# Patient Record
Sex: FEMALE | Race: BLACK OR AFRICAN AMERICAN | NOT HISPANIC OR LATINO | Employment: FULL TIME | URBAN - METROPOLITAN AREA
[De-identification: names, ages, dates, MRNs, and addresses within clinical notes are randomized per-mention and may not be internally consistent; named-entity substitution may affect disease eponyms.]

---

## 2018-06-10 ENCOUNTER — HOSPITAL ENCOUNTER (EMERGENCY)
Facility: HOSPITAL | Age: 33
Discharge: HOME | End: 2018-06-10
Attending: FAMILY MEDICINE
Payer: MEDICAID

## 2018-06-10 VITALS
RESPIRATION RATE: 20 BRPM | WEIGHT: 135 LBS | HEART RATE: 87 BPM | SYSTOLIC BLOOD PRESSURE: 132 MMHG | BODY MASS INDEX: 25.49 KG/M2 | HEIGHT: 61 IN | OXYGEN SATURATION: 100 % | TEMPERATURE: 99 F | DIASTOLIC BLOOD PRESSURE: 84 MMHG

## 2018-06-10 DIAGNOSIS — M79.671 RIGHT FOOT PAIN: ICD-10-CM

## 2018-06-10 DIAGNOSIS — S93.401A RIGHT ANKLE SPRAIN: ICD-10-CM

## 2018-06-10 DIAGNOSIS — M79.672 FOOT PAIN, LEFT: ICD-10-CM

## 2018-06-10 DIAGNOSIS — M25.569 KNEE PAIN: ICD-10-CM

## 2018-06-10 DIAGNOSIS — S92.312A CLOSED DISPLACED FRACTURE OF FIRST METATARSAL BONE OF LEFT FOOT, INITIAL ENCOUNTER: Primary | ICD-10-CM

## 2018-06-10 PROCEDURE — 99283 EMERGENCY DEPT VISIT LOW MDM: CPT | Mod: 25

## 2018-06-10 PROCEDURE — 63600175 PHARM REV CODE 636 W HCPCS: Performed by: FAMILY MEDICINE

## 2018-06-10 PROCEDURE — 29515 APPLICATION SHORT LEG SPLINT: CPT

## 2018-06-10 PROCEDURE — 25000003 PHARM REV CODE 250: Performed by: FAMILY MEDICINE

## 2018-06-10 PROCEDURE — 96372 THER/PROPH/DIAG INJ SC/IM: CPT | Mod: 59

## 2018-06-10 RX ORDER — IBUPROFEN 800 MG/1
800 TABLET ORAL 3 TIMES DAILY PRN
Qty: 30 TABLET | Refills: 0 | Status: SHIPPED | OUTPATIENT
Start: 2018-06-10 | End: 2019-07-07

## 2018-06-10 RX ORDER — ORPHENADRINE CITRATE 30 MG/ML
30 INJECTION INTRAMUSCULAR; INTRAVENOUS
Status: COMPLETED | OUTPATIENT
Start: 2018-06-10 | End: 2018-06-10

## 2018-06-10 RX ORDER — HYDROCODONE BITARTRATE AND ACETAMINOPHEN 5; 325 MG/1; MG/1
1 TABLET ORAL EVERY 8 HOURS PRN
Qty: 12 TABLET | Refills: 0 | OUTPATIENT
Start: 2018-06-10 | End: 2019-01-07

## 2018-06-10 RX ORDER — NAPROXEN SODIUM 220 MG/1
81 TABLET, FILM COATED ORAL DAILY
COMMUNITY

## 2018-06-10 RX ORDER — KETOROLAC TROMETHAMINE 10 MG/1
10 TABLET, FILM COATED ORAL
Status: COMPLETED | OUTPATIENT
Start: 2018-06-10 | End: 2018-06-10

## 2018-06-10 RX ADMIN — ORPHENADRINE CITRATE 30 MG: 30 INJECTION INTRAMUSCULAR; INTRAVENOUS at 02:06

## 2018-06-10 RX ADMIN — KETOROLAC TROMETHAMINE 10 MG: 10 TABLET, FILM COATED ORAL at 01:06
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
## 2018-06-10 NOTE — ED NOTES
Short leg splint applied, pt tolerated well; pt educated on cast care; pt verbalized an understanding; Dr. Grant at bedside for assessment of splint and ok to discharge.
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
## 2018-11-10 ENCOUNTER — HOSPITAL ENCOUNTER (EMERGENCY)
Facility: HOSPITAL | Age: 33
Discharge: HOME | End: 2018-11-10
Attending: EMERGENCY MEDICINE
Payer: MEDICAID

## 2018-11-10 VITALS
RESPIRATION RATE: 19 BRPM | TEMPERATURE: 99 F | WEIGHT: 145 LBS | OXYGEN SATURATION: 100 % | BODY MASS INDEX: 27.38 KG/M2 | HEIGHT: 61 IN | DIASTOLIC BLOOD PRESSURE: 79 MMHG | HEART RATE: 92 BPM | SYSTOLIC BLOOD PRESSURE: 118 MMHG

## 2018-11-10 DIAGNOSIS — R05.9 COUGH: ICD-10-CM

## 2018-11-10 DIAGNOSIS — J06.9 URI, ACUTE: Primary | ICD-10-CM

## 2018-11-10 LAB
DEPRECATED S PYO AG THROAT QL EIA: NEGATIVE
FLUAV AG SPEC QL IA: NEGATIVE
FLUBV AG SPEC QL IA: NEGATIVE
SPECIMEN SOURCE: NORMAL

## 2018-11-10 PROCEDURE — 87400 INFLUENZA A/B EACH AG IA: CPT

## 2018-11-10 PROCEDURE — 87081 CULTURE SCREEN ONLY: CPT

## 2018-11-10 PROCEDURE — 63600175 PHARM REV CODE 636 W HCPCS: Performed by: PHYSICIAN ASSISTANT

## 2018-11-10 PROCEDURE — 87880 STREP A ASSAY W/OPTIC: CPT

## 2018-11-10 PROCEDURE — 99283 EMERGENCY DEPT VISIT LOW MDM: CPT | Mod: 25

## 2018-11-10 PROCEDURE — 96372 THER/PROPH/DIAG INJ SC/IM: CPT

## 2018-11-10 RX ORDER — DEXAMETHASONE SODIUM PHOSPHATE 4 MG/ML
8 INJECTION, SOLUTION INTRA-ARTICULAR; INTRALESIONAL; INTRAMUSCULAR; INTRAVENOUS; SOFT TISSUE
Status: COMPLETED | OUTPATIENT
Start: 2018-11-10 | End: 2018-11-10

## 2018-11-10 RX ORDER — BENZONATATE 100 MG/1
100 CAPSULE ORAL 3 TIMES DAILY PRN
Qty: 10 CAPSULE | Refills: 0 | Status: SHIPPED | OUTPATIENT
Start: 2018-11-10 | End: 2018-11-20

## 2018-11-10 RX ADMIN — DEXAMETHASONE SODIUM PHOSPHATE 8 MG: 4 INJECTION, SOLUTION INTRAMUSCULAR; INTRAVENOUS at 10:11

## 2018-11-11 NOTE — ED PROVIDER NOTES
Encounter Date: 11/10/2018       History     Chief Complaint   Patient presents with    Nasal Congestion     pt reports congestion with otalgia and post nasal drip X 4 days PTA     33-year-old female presents emergency department for evaluation of 4 day history of nasal congestion, cough, postnasal drip and right-sided ear pain. She reports that the symptoms began gradually 4 days ago and have been constant since onset.  She reports that the cough is productive with a mildly green sputum.  She reports that she has pain and pressure at her right ear which is radiating to her throat. She denies any headache, dizziness, vision changes, chest pain, shortness of breath, abdominal pain, nausea, vomiting or dysuria.  She attempted treatment at home with DayQuil and Ryann-Gypsum with minimal relief of symptoms.  Last dose was yesterday.  No treatment was attempted today prior to arrival.  She reports her last menstrual period was on 10/15/2018.  She denies risk of pregnancy.          Review of patient's allergies indicates:  No Known Allergies  Past Medical History:   Diagnosis Date    CHF (congestive heart failure)     Hypertension      History reviewed. No pertinent surgical history.  History reviewed. No pertinent family history.  Social History     Tobacco Use    Smoking status: Never Smoker    Smokeless tobacco: Never Used   Substance Use Topics    Alcohol use: Yes     Comment: occasionally    Drug use: No     Review of Systems   Constitutional: Negative for activity change, appetite change and fever.   HENT: Positive for congestion, postnasal drip, rhinorrhea, sinus pressure and sore throat. Negative for drooling, facial swelling, trouble swallowing and voice change.    Eyes: Negative for photophobia and visual disturbance.   Respiratory: Positive for cough. Negative for chest tightness, shortness of breath and wheezing.    Cardiovascular: Negative for chest pain.   Gastrointestinal: Negative for abdominal  pain, constipation, diarrhea, nausea and vomiting.   Genitourinary: Negative for decreased urine volume, dysuria and hematuria.   Musculoskeletal: Negative for back pain, joint swelling and neck pain.   Neurological: Negative for dizziness, syncope, weakness, light-headedness, numbness and headaches.       Physical Exam     Initial Vitals [11/10/18 2122]   BP Pulse Resp Temp SpO2   118/79 92 19 98.6 °F (37 °C) 100 %      MAP       --         Physical Exam    Nursing note and vitals reviewed.  Constitutional: She appears well-developed and well-nourished. She is not diaphoretic. No distress.   HENT:   Head: Normocephalic and atraumatic.   Right Ear: External ear normal.   Left Ear: External ear normal.   Nose: Nose normal.   Mouth/Throat: Oropharynx is clear and moist.   TMs reveal no erythema.  No perforation noted. No tenderness to palpation noted over the pinna, tragus or mastoids.  No tenderness to percussion noted over the frontal or maxillary sinuses.  Nasal terminates mildly boggy, no purulent discharge noted.  No postnasal drip noted.  Posterior pharynx reveals erythema, edema or tonsillar exudate.  No uvular edema or deviation noted. No trismus, stridor or drooling noted.   Eyes: Conjunctivae and EOM are normal. Pupils are equal, round, and reactive to light.   Neck: Normal range of motion. Neck supple.   Cardiovascular: Normal rate, regular rhythm and normal heart sounds.   Pulmonary/Chest: Breath sounds normal. No respiratory distress. She has no wheezes. She has no rhonchi. She has no rales. She exhibits no tenderness.   Abdominal: Soft. Bowel sounds are normal. She exhibits no distension. There is no tenderness. There is no rebound.   Musculoskeletal: Normal range of motion.   Lymphadenopathy:     She has no cervical adenopathy.   Neurological: She is alert and oriented to person, place, and time.   Skin: Skin is warm and dry.   Psychiatric: She has a normal mood and affect.         ED Course    Procedures  Labs Reviewed   THROAT SCREEN, RAPID   CULTURE, STREP A,  THROAT   INFLUENZA A AND B ANTIGEN          Imaging Results          X-Ray Chest PA And Lateral (Final result)  Result time 11/10/18 22:01:16    Final result by ABBY Marino Sr., MD (11/10/18 22:01:16)                 Impression:      1. The lungs are clear.  2. There is a minimal amount of dextroconvex curvature of the thoracic spine.  .      Electronically signed by: Aydin Marino MD  Date:    11/10/2018  Time:    22:01             Narrative:    EXAMINATION:  XR CHEST PA AND LATERAL    CLINICAL HISTORY:  Cough    COMPARISON:  05/31/2010    FINDINGS:  The size and contour of the heart are normal. The lungs are clear. There is no pneumothorax or pleural effusion.  There is a minimal amount of dextroconvex curvature of the thoracic spine.                                 Medical Decision Making:   Initial Assessment:   33-year-old female presents for evaluation of nasal congestion, otalgia, cough and rhinorrhea. Physical exam reveals a nontoxic-appearing female in no acute distress. Patient is afebrile vital signs are within normal limits. Neurological exam reveals an alert and oriented patient. TMs reveal no erythema.  No perforation noted. No tenderness to palpation noted over the pinna, tragus or mastoids.  No tenderness to percussion noted over the frontal or maxillary sinuses.  Nasal terminates mildly boggy, no purulent discharge noted.  No postnasal drip noted.  Posterior pharynx reveals erythema, edema or tonsillar exudate.  No uvular edema or deviation noted. No trismus, stridor or drooling noted. Neck is supple with no meningeal signs noted. Lungs clear to auscultation bilaterally. No respiratory distress or accessory muscle use noted.  Differential Diagnosis:   Viral URI  Streptococcal pharyngitis  Influenza  Chest x-ray ordered to assess possible pneumonia or consolidation.    ED Management:  Chest x-ray reveals no evidence of  pneumonia or consolidation.  Minimal dextro convex curvature of the thoracic spine noted.  Influenza negative. Rapid strep negative. Discussed these findings at length with the patient verbalizes understanding and agreement course treatment.  Patient given Decadron for symptom control.  Instructed the patient to follow up with the primary care provider for re-evaluation and to return to the emergency department immediately for any new or worsening symptoms.                      Clinical Impression:   The primary encounter diagnosis was URI, acute. A diagnosis of Cough was also pertinent to this visit.                             Sangeeta Rogers PA-C  11/11/18 4293

## 2018-11-11 NOTE — DISCHARGE INSTRUCTIONS
Your chest x-ray did not reveal any evidence of pneumonia or consolidation.  Your strep test and influenza test were negative. You are instructed to follow up with your primary care provider for re-evaluation within 3 days.

## 2018-11-14 LAB — BACTERIA THROAT CULT: NORMAL

## 2019-01-07 ENCOUNTER — HOSPITAL ENCOUNTER (EMERGENCY)
Facility: HOSPITAL | Age: 34
Discharge: HOME | End: 2019-01-07
Attending: EMERGENCY MEDICINE
Payer: MEDICAID

## 2019-01-07 VITALS
WEIGHT: 150 LBS | RESPIRATION RATE: 20 BRPM | BODY MASS INDEX: 28.32 KG/M2 | SYSTOLIC BLOOD PRESSURE: 119 MMHG | OXYGEN SATURATION: 99 % | HEIGHT: 61 IN | TEMPERATURE: 98 F | DIASTOLIC BLOOD PRESSURE: 77 MMHG | HEART RATE: 78 BPM

## 2019-01-07 DIAGNOSIS — M54.9 BACK PAIN: ICD-10-CM

## 2019-01-07 DIAGNOSIS — S23.9XXA THORACIC BACK SPRAIN, INITIAL ENCOUNTER: Primary | ICD-10-CM

## 2019-01-07 LAB
B-HCG UR QL: NEGATIVE
BACTERIA #/AREA URNS AUTO: NORMAL /HPF
BILIRUB UR QL STRIP: NEGATIVE
CAOX CRY UR QL COMP ASSIST: NORMAL
CLARITY UR REFRACT.AUTO: CLEAR
COLOR UR AUTO: ABNORMAL
GLUCOSE UR QL STRIP: NEGATIVE
HGB UR QL STRIP: ABNORMAL
KETONES UR QL STRIP: ABNORMAL
LEUKOCYTE ESTERASE UR QL STRIP: ABNORMAL
MICROSCOPIC COMMENT: NORMAL
NITRITE UR QL STRIP: NEGATIVE
PH UR STRIP: 6 [PH] (ref 5–8)
PROT UR QL STRIP: ABNORMAL
RBC #/AREA URNS AUTO: 2 /HPF (ref 0–4)
SP GR UR STRIP: 1.02 (ref 1–1.03)
SQUAMOUS #/AREA URNS AUTO: NORMAL /HPF
URN SPEC COLLECT METH UR: ABNORMAL
UROBILINOGEN UR STRIP-ACNC: 1 EU/DL
WBC #/AREA URNS AUTO: 2 /HPF (ref 0–5)

## 2019-01-07 PROCEDURE — 63600175 PHARM REV CODE 636 W HCPCS: Mod: ER | Performed by: PHYSICIAN ASSISTANT

## 2019-01-07 PROCEDURE — 25000003 PHARM REV CODE 250: Mod: ER | Performed by: PHYSICIAN ASSISTANT

## 2019-01-07 PROCEDURE — 81025 URINE PREGNANCY TEST: CPT | Mod: ER

## 2019-01-07 PROCEDURE — 93010 EKG 12-LEAD: ICD-10-PCS | Mod: ,,, | Performed by: INTERNAL MEDICINE

## 2019-01-07 PROCEDURE — 96372 THER/PROPH/DIAG INJ SC/IM: CPT | Mod: 59,ER

## 2019-01-07 PROCEDURE — 99284 EMERGENCY DEPT VISIT MOD MDM: CPT | Mod: 25,ER

## 2019-01-07 PROCEDURE — 93010 ELECTROCARDIOGRAM REPORT: CPT | Mod: ,,, | Performed by: INTERNAL MEDICINE

## 2019-01-07 PROCEDURE — 93005 ELECTROCARDIOGRAM TRACING: CPT | Mod: ER

## 2019-01-07 PROCEDURE — 81000 URINALYSIS NONAUTO W/SCOPE: CPT | Mod: ER

## 2019-01-07 RX ORDER — METOPROLOL SUCCINATE 50 MG/1
50 TABLET, EXTENDED RELEASE ORAL DAILY
Qty: 30 TABLET | Refills: 1 | Status: SHIPPED | OUTPATIENT
Start: 2019-01-07 | End: 2020-10-30 | Stop reason: SDUPTHER

## 2019-01-07 RX ORDER — KETOROLAC TROMETHAMINE 30 MG/ML
60 INJECTION, SOLUTION INTRAMUSCULAR; INTRAVENOUS
Status: COMPLETED | OUTPATIENT
Start: 2019-01-07 | End: 2019-01-07

## 2019-01-07 RX ORDER — HYDROCODONE BITARTRATE AND ACETAMINOPHEN 10; 325 MG/1; MG/1
1 TABLET ORAL EVERY 4 HOURS PRN
Qty: 18 TABLET | Refills: 0 | Status: SHIPPED | OUTPATIENT
Start: 2019-01-07 | End: 2019-07-07

## 2019-01-07 RX ORDER — DIAZEPAM 5 MG/1
5 TABLET ORAL
Status: COMPLETED | OUTPATIENT
Start: 2019-01-07 | End: 2019-01-07

## 2019-01-07 RX ORDER — DIAZEPAM 5 MG/1
5 TABLET ORAL EVERY 6 HOURS PRN
Qty: 10 TABLET | Refills: 0 | Status: SHIPPED | OUTPATIENT
Start: 2019-01-07 | End: 2019-07-07

## 2019-01-07 RX ORDER — FUROSEMIDE 80 MG/1
80 TABLET ORAL DAILY
Qty: 30 TABLET | Refills: 0 | Status: SHIPPED | OUTPATIENT
Start: 2019-01-07 | End: 2020-07-07 | Stop reason: SDUPTHER

## 2019-01-07 RX ADMIN — DIAZEPAM 5 MG: 5 TABLET ORAL at 08:01

## 2019-01-07 RX ADMIN — KETOROLAC TROMETHAMINE 60 MG: 30 INJECTION, SOLUTION INTRAMUSCULAR at 08:01

## 2019-01-08 NOTE — ED PROVIDER NOTES
"Encounter Date: 1/7/2019       History     Chief Complaint   Patient presents with    Back Pain     Pt states "I woke up this morning with my back pain and it wraps around into my chest. I had this before and it was a stroke. I have a headache as well." Pt c/o upper back pain 8/10, states she could not lift anything at work. Pt states she has been diaphoretic and SOB, denies nausea.     Chest Pain     Patient is a 33-year-old female with a past medical history of spondylosis, thoracic disc injury with disease and scoliosis of her upper back.  Patient woke up this morning with upper back pain radiating along her ribcage from back to front.  This was aggravated by movement alleviated somewhat by rest.  The pain is worse whenever she tries to lift up her arms or rotate her shoulder.  She has not tried any medication.          Review of patient's allergies indicates:  No Known Allergies  Past Medical History:   Diagnosis Date    CHF (congestive heart failure)     Hypertension      History reviewed. No pertinent surgical history.  History reviewed. No pertinent family history.  Social History     Tobacco Use    Smoking status: Never Smoker    Smokeless tobacco: Never Used   Substance Use Topics    Alcohol use: Yes     Comment: occasionally    Drug use: No     Review of Systems   Constitutional: Negative for diaphoresis, fatigue and fever.        14 Point ROS completed and negative with the exception of HPI and Below.   HENT: Negative for congestion, ear pain and sore throat.    Eyes: Negative for discharge and itching.   Respiratory: Negative for cough, chest tightness, shortness of breath and wheezing.    Cardiovascular: Negative for chest pain, palpitations and leg swelling.   Gastrointestinal: Negative for abdominal distention, abdominal pain, nausea and vomiting.   Genitourinary: Negative for dysuria, flank pain and frequency.   Musculoskeletal: Positive for back pain. Negative for neck pain and neck " stiffness.        Rib pain   Skin: Negative for pallor, rash and wound.   Neurological: Negative for dizziness, syncope, facial asymmetry, weakness and headaches.   Hematological: Does not bruise/bleed easily.   Psychiatric/Behavioral: Negative for agitation, behavioral problems and confusion.   All other systems reviewed and are negative.      Physical Exam     Initial Vitals [01/07/19 1948]   BP Pulse Resp Temp SpO2   (!) 123/91 87 20 98.6 °F (37 °C) --      MAP       --         Physical Exam    Constitutional: She appears well-developed and well-nourished.   HENT:   Head: Normocephalic and atraumatic.   Right Ear: External ear normal.   Left Ear: External ear normal.   Nose: Nose normal.   Mouth/Throat: Oropharynx is clear and moist.   Eyes: Conjunctivae and EOM are normal. Pupils are equal, round, and reactive to light. Right eye exhibits no discharge. Left eye exhibits no discharge. No scleral icterus.   Neck: Normal range of motion. Neck supple. No thyromegaly present. No tracheal deviation present. No JVD present.   Cardiovascular: Normal rate, regular rhythm, normal heart sounds and intact distal pulses. Exam reveals no gallop and no friction rub.    No murmur heard.  Pulmonary/Chest: Breath sounds normal. No stridor. No respiratory distress. She has no wheezes. She has no rhonchi. She has no rales. She exhibits no tenderness.   Abdominal: Soft. Bowel sounds are normal. She exhibits no distension and no mass. There is no tenderness. There is no rebound and no guarding.   Musculoskeletal: Normal range of motion. She exhibits tenderness. She exhibits no edema.   On exam the patient has tenderness noted to the middle to upper thoracic spine in the midline as well as to the paraspinous musculature with muscle spasm on the left side.  She has pain along her ribs and a nerve pathway from the back to the front.  There is also tenderness to the rib in the front as well.  This pain is similar to previous in the past  when she was diagnosed with thoracic disc disease.     Lymphadenopathy:     She has no cervical adenopathy.   Neurological: She is alert and oriented to person, place, and time. She has normal strength and normal reflexes. She displays normal reflexes. No cranial nerve deficit or sensory deficit.   Skin: Skin is warm and dry. Capillary refill takes less than 2 seconds. No rash and no abscess noted. No erythema. No pallor.   Psychiatric: She has a normal mood and affect. Her behavior is normal. Thought content normal.         ED Course   Procedures  Labs Reviewed   PREGNANCY TEST, URINE RAPID   URINALYSIS, REFLEX TO URINE CULTURE          Imaging Results    None          Medical Decision Making:   Initial Assessment:   Patient is a 33-year-old female with a past medical history of spondylosis, thoracic disc injury with disease and scoliosis of her upper back.  Patient woke up this morning with upper back pain radiating along her ribcage from back to front.  This was aggravated by movement alleviated somewhat by rest.  The pain is worse whenever she tries to lift up her arms or rotate her shoulder.  She has not tried any medication.  On exam the patient has tenderness noted to the middle to upper thoracic spine in the midline as well as to the paraspinous musculature with muscle spasm on the left side.  She has pain along her ribs and a nerve pathway from the back to the front.  There is also tenderness to the rib in the front as well.  This pain is similar to previous in the past when she was diagnosed with thoracic disc disease.  Differential Diagnosis:   Herniated intervertebral disc, muscle sprain, nerve radiculopathy.  ED Management:  Muscle relaxers, anti-inflammatories, pain medication and patient will need to follow up with her spinal physician.  Patient reports being out of her metoprolol and her Lasix, requesting a refill on that which we will provide.                      Clinical Impression:   The primary  encounter diagnosis was Thoracic back sprain, initial encounter. A diagnosis of Back pain was also pertinent to this visit.                             Tu Hahn PA-C  01/07/19 2048       Tu Hahn PA-C  01/07/19 2050

## 2019-01-08 NOTE — ED NOTES
"Been having neck and back pain. Stated it also wrapped around her chest. " I use to see a specialist before but I stop going."  "

## 2019-01-08 NOTE — ED TRIAGE NOTES
"Pt states "I woke up this morning with my back pain and it wraps around into my chest. I had this before and it was a stroke. I have a headache as well." Pt c/o upper back pain 8/10, states she could not lift anything at work. Pt states she has been diaphoretic and SOB, denies nausea.   "

## 2019-06-09 ENCOUNTER — HOSPITAL ENCOUNTER (EMERGENCY)
Facility: HOSPITAL | Age: 34
Discharge: HOME | End: 2019-06-09
Attending: FAMILY MEDICINE
Payer: COMMERCIAL

## 2019-06-09 VITALS
BODY MASS INDEX: 31.15 KG/M2 | HEIGHT: 61 IN | HEART RATE: 81 BPM | SYSTOLIC BLOOD PRESSURE: 117 MMHG | DIASTOLIC BLOOD PRESSURE: 76 MMHG | WEIGHT: 165 LBS | OXYGEN SATURATION: 97 % | RESPIRATION RATE: 20 BRPM | TEMPERATURE: 99 F

## 2019-06-09 DIAGNOSIS — M25.512 ACUTE PAIN OF LEFT SHOULDER: Primary | ICD-10-CM

## 2019-06-09 DIAGNOSIS — R05.9 COUGH: ICD-10-CM

## 2019-06-09 LAB
ALBUMIN SERPL BCP-MCNC: 4.8 G/DL (ref 3.5–5.2)
ALP SERPL-CCNC: 69 U/L (ref 38–126)
ALT SERPL W/O P-5'-P-CCNC: 21 U/L (ref 10–44)
ANION GAP SERPL CALC-SCNC: 11 MMOL/L (ref 8–16)
AST SERPL-CCNC: 19 U/L (ref 15–46)
BASOPHILS # BLD AUTO: 0.02 K/UL (ref 0–0.2)
BASOPHILS NFR BLD: 0.3 % (ref 0–1.9)
BILIRUB SERPL-MCNC: 0.5 MG/DL (ref 0.1–1)
BUN SERPL-MCNC: 11 MG/DL (ref 7–17)
CALCIUM SERPL-MCNC: 10 MG/DL (ref 8.7–10.5)
CHLORIDE SERPL-SCNC: 102 MMOL/L (ref 95–110)
CO2 SERPL-SCNC: 29 MMOL/L (ref 23–29)
CREAT SERPL-MCNC: 0.89 MG/DL (ref 0.5–1.4)
DIFFERENTIAL METHOD: ABNORMAL
EOSINOPHIL # BLD AUTO: 0.1 K/UL (ref 0–0.5)
EOSINOPHIL NFR BLD: 1 % (ref 0–8)
ERYTHROCYTE [DISTWIDTH] IN BLOOD BY AUTOMATED COUNT: 13.5 % (ref 11.5–14.5)
EST. GFR  (AFRICAN AMERICAN): >60 ML/MIN/1.73 M^2
EST. GFR  (NON AFRICAN AMERICAN): >60 ML/MIN/1.73 M^2
GLUCOSE SERPL-MCNC: 99 MG/DL (ref 70–110)
HCT VFR BLD AUTO: 40.7 % (ref 37–48.5)
HGB BLD-MCNC: 13.5 G/DL (ref 12–16)
LYMPHOCYTES # BLD AUTO: 2.9 K/UL (ref 1–4.8)
LYMPHOCYTES NFR BLD: 51.1 % (ref 18–48)
MCH RBC QN AUTO: 29.2 PG (ref 27–31)
MCHC RBC AUTO-ENTMCNC: 33.2 G/DL (ref 32–36)
MCV RBC AUTO: 88 FL (ref 82–98)
MONOCYTES # BLD AUTO: 0.5 K/UL (ref 0.3–1)
MONOCYTES NFR BLD: 7.9 % (ref 4–15)
NEUTROPHILS # BLD AUTO: 2.3 K/UL (ref 1.8–7.7)
NEUTROPHILS NFR BLD: 39.5 % (ref 38–73)
NT-PROBNP: 73 PG/ML (ref 5–450)
PLATELET # BLD AUTO: 302 K/UL (ref 150–350)
PMV BLD AUTO: 11.9 FL (ref 9.2–12.9)
POTASSIUM SERPL-SCNC: 3.5 MMOL/L (ref 3.5–5.1)
PROT SERPL-MCNC: 8.7 G/DL (ref 6–8.4)
RBC # BLD AUTO: 4.62 M/UL (ref 4–5.4)
SODIUM SERPL-SCNC: 142 MMOL/L (ref 136–145)
WBC # BLD AUTO: 5.73 K/UL (ref 3.9–12.7)

## 2019-06-09 PROCEDURE — 83880 ASSAY OF NATRIURETIC PEPTIDE: CPT | Mod: ER

## 2019-06-09 PROCEDURE — 80053 COMPREHEN METABOLIC PANEL: CPT | Mod: ER

## 2019-06-09 PROCEDURE — 25000003 PHARM REV CODE 250: Mod: ER | Performed by: NURSE PRACTITIONER

## 2019-06-09 PROCEDURE — 85025 COMPLETE CBC W/AUTO DIFF WBC: CPT | Mod: ER

## 2019-06-09 PROCEDURE — 99284 EMERGENCY DEPT VISIT MOD MDM: CPT | Mod: 25,ER

## 2019-06-09 RX ORDER — CODEINE PHOSPHATE AND GUAIFENESIN 10; 100 MG/5ML; MG/5ML
5 SOLUTION ORAL 3 TIMES DAILY PRN
Qty: 120 ML | Refills: 0 | Status: SHIPPED | OUTPATIENT
Start: 2019-06-09 | End: 2019-06-19

## 2019-06-09 RX ORDER — KETOROLAC TROMETHAMINE 10 MG/1
10 TABLET, FILM COATED ORAL EVERY 6 HOURS
Qty: 20 TABLET | Refills: 0 | Status: SHIPPED | OUTPATIENT
Start: 2019-06-09 | End: 2019-07-07

## 2019-06-09 RX ORDER — IBUPROFEN 400 MG/1
800 TABLET ORAL
Status: COMPLETED | OUTPATIENT
Start: 2019-06-09 | End: 2019-06-09

## 2019-06-09 RX ADMIN — IBUPROFEN 800 MG: 400 TABLET, FILM COATED ORAL at 10:06

## 2019-06-10 NOTE — ED PROVIDER NOTES
Encounter Date: 6/9/2019       History     Chief Complaint   Patient presents with    Cough     dry cough x 2 weeks. afebrile at home. hx of chf.    Arm Pain     left arm pain x4 days. hurts to lay on arm. denies injury     Patient is a 34-year-old female that presents with a complaint of cough x2 weeks.  She reports history of congestive heart failure and is currently taking medications as prescribed.  States that she feels like she had the flu over a week ago but that the cough still continues.  Denies any fever, nausea and vomiting. Denies any chest pain or shortness of breath at this time.  Also complains of left shoulder and arm pain and weakness x3 days.  Denies any injury to the left arm.  States that when she tries to lift something with her arm she gets pain in her shoulder that radiates down the left arm.  Patient shows no signs of distress at this time.  No medications taken for relief of pain in the left arm.        Review of patient's allergies indicates:  No Known Allergies  Past Medical History:   Diagnosis Date    CHF (congestive heart failure)     Hypertension      History reviewed. No pertinent surgical history.  History reviewed. No pertinent family history.  Social History     Tobacco Use    Smoking status: Never Smoker    Smokeless tobacco: Never Used   Substance Use Topics    Alcohol use: Yes     Comment: occasionally    Drug use: No     Review of Systems   Constitutional: Negative for fever.   HENT: Negative for sore throat.    Respiratory: Positive for cough. Negative for shortness of breath.    Cardiovascular: Negative for chest pain.   Gastrointestinal: Negative for nausea.   Genitourinary: Negative for dysuria.   Musculoskeletal: Positive for myalgias (Left shoulder and arm). Negative for back pain.   Skin: Negative for rash.   Neurological: Negative for weakness.   Hematological: Does not bruise/bleed easily.   All other systems reviewed and are negative.      Physical Exam      Initial Vitals [06/09/19 2133]   BP Pulse Resp Temp SpO2   117/76 81 20 98.5 °F (36.9 °C) 97 %      MAP       --         Physical Exam    Nursing note and vitals reviewed.  Constitutional: She appears well-developed and well-nourished.   HENT:   Head: Normocephalic and atraumatic.   Eyes: EOM are normal. Pupils are equal, round, and reactive to light.   Neck: Normal range of motion. Neck supple.   Cardiovascular: Normal rate, regular rhythm, normal heart sounds and intact distal pulses.   Pulmonary/Chest: Breath sounds normal.   Abdominal: Soft. Bowel sounds are normal.   Musculoskeletal: Normal range of motion.        Left shoulder: She exhibits tenderness (Tenderness noted to the anterior left shoulder) and pain (Left shoulder). She exhibits normal range of motion.   Neurological: She is alert and oriented to person, place, and time. She has normal strength and normal reflexes.   Skin: Skin is warm and dry.         ED Course   Procedures  Labs Reviewed   CBC W/ AUTO DIFFERENTIAL - Abnormal; Notable for the following components:       Result Value    Lymph% 51.1 (*)     All other components within normal limits   COMPREHENSIVE METABOLIC PANEL   NT-PRO NATRIURETIC PEPTIDE          Imaging Results    None          Medical Decision Making:   Clinical Tests:   Lab Tests: Reviewed  Radiological Study: Reviewed  ED Management:  Patient informed of all imaging and lab results.  Patient instructed to take medications as prescribed and follow up with primary care physician.  Patient to return to the emergency room with any worsening symptoms. No distress noted at time of discharge.                      Clinical Impression:       ICD-10-CM ICD-9-CM   1. Acute pain of left shoulder M25.512 719.41   2. Cough R05 786.2         Disposition:   Disposition: Discharged                        Jacob Chen NP  06/09/19 4103

## 2019-06-10 NOTE — ED TRIAGE NOTES
dry cough x 2 weeks. afebrile at home. hx of chf. Left arm pain x 4 days, painful to lay on left side.

## 2019-07-07 ENCOUNTER — HOSPITAL ENCOUNTER (EMERGENCY)
Facility: HOSPITAL | Age: 34
Discharge: HOME | End: 2019-07-07
Attending: SURGERY
Payer: COMMERCIAL

## 2019-07-07 VITALS
HEART RATE: 69 BPM | DIASTOLIC BLOOD PRESSURE: 89 MMHG | RESPIRATION RATE: 15 BRPM | OXYGEN SATURATION: 99 % | WEIGHT: 155 LBS | TEMPERATURE: 98 F | BODY MASS INDEX: 29.29 KG/M2 | SYSTOLIC BLOOD PRESSURE: 126 MMHG

## 2019-07-07 DIAGNOSIS — T23.039A BURN OF FINGERS: Primary | ICD-10-CM

## 2019-07-07 PROCEDURE — 25000003 PHARM REV CODE 250: Mod: ER | Performed by: PHYSICIAN ASSISTANT

## 2019-07-07 PROCEDURE — 96372 THER/PROPH/DIAG INJ SC/IM: CPT | Mod: 59,ER

## 2019-07-07 PROCEDURE — 99284 EMERGENCY DEPT VISIT MOD MDM: CPT | Mod: 25,ER

## 2019-07-07 PROCEDURE — 90715 TDAP VACCINE 7 YRS/> IM: CPT | Mod: ER | Performed by: PHYSICIAN ASSISTANT

## 2019-07-07 PROCEDURE — 63600175 PHARM REV CODE 636 W HCPCS: Mod: ER | Performed by: PHYSICIAN ASSISTANT

## 2019-07-07 PROCEDURE — 16020 DRESS/DEBRID P-THICK BURN S: CPT | Mod: ER

## 2019-07-07 PROCEDURE — 90471 IMMUNIZATION ADMIN: CPT | Mod: ER | Performed by: PHYSICIAN ASSISTANT

## 2019-07-07 RX ORDER — SULFAMETHOXAZOLE AND TRIMETHOPRIM 800; 160 MG/1; MG/1
1 TABLET ORAL
Status: COMPLETED | OUTPATIENT
Start: 2019-07-07 | End: 2019-07-07

## 2019-07-07 RX ORDER — KETOROLAC TROMETHAMINE 30 MG/ML
30 INJECTION, SOLUTION INTRAMUSCULAR; INTRAVENOUS
Status: COMPLETED | OUTPATIENT
Start: 2019-07-07 | End: 2019-07-07

## 2019-07-07 RX ORDER — KETOROLAC TROMETHAMINE 10 MG/1
10 TABLET, FILM COATED ORAL EVERY 6 HOURS
Qty: 10 TABLET | Refills: 0 | Status: SHIPPED | OUTPATIENT
Start: 2019-07-07 | End: 2020-06-03

## 2019-07-07 RX ORDER — SILVER SULFADIAZINE 10 G/1000G
CREAM TOPICAL
Status: DISCONTINUED
Start: 2019-07-07 | End: 2019-07-07 | Stop reason: WASHOUT

## 2019-07-07 RX ORDER — SULFAMETHOXAZOLE AND TRIMETHOPRIM 800; 160 MG/1; MG/1
1 TABLET ORAL 2 TIMES DAILY
Qty: 14 TABLET | Refills: 0 | Status: SHIPPED | OUTPATIENT
Start: 2019-07-07 | End: 2019-07-14

## 2019-07-07 RX ORDER — MUPIROCIN 20 MG/G
OINTMENT TOPICAL 3 TIMES DAILY
Qty: 15 G | Refills: 0 | Status: SHIPPED | OUTPATIENT
Start: 2019-07-07 | End: 2020-10-30 | Stop reason: ALTCHOICE

## 2019-07-07 RX ADMIN — BACITRACIN, NEOMYCIN, POLYMYXIN B 1 EACH: 400; 3.5; 5 OINTMENT TOPICAL at 12:07

## 2019-07-07 RX ADMIN — KETOROLAC TROMETHAMINE 30 MG: 30 INJECTION, SOLUTION INTRAMUSCULAR at 12:07

## 2019-07-07 RX ADMIN — CLOSTRIDIUM TETANI TOXOID ANTIGEN (FORMALDEHYDE INACTIVATED), CORYNEBACTERIUM DIPHTHERIAE TOXOID ANTIGEN (FORMALDEHYDE INACTIVATED), BORDETELLA PERTUSSIS TOXOID ANTIGEN (GLUTARALDEHYDE INACTIVATED), BORDETELLA PERTUSSIS FILAMENTOUS HEMAGGLUTININ ANTIGEN (FORMALDEHYDE INACTIVATED), BORDETELLA PERTUSSIS PERTACTIN ANTIGEN, AND BORDETELLA PERTUSSIS FIMBRIAE 2/3 ANTIGEN 0.5 ML: 5; 2; 2.5; 5; 3; 5 INJECTION, SUSPENSION INTRAMUSCULAR at 01:07

## 2019-07-07 RX ADMIN — SULFAMETHOXAZOLE AND TRIMETHOPRIM 1 TABLET: 800; 160 TABLET ORAL at 12:07
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
## 2019-07-07 NOTE — DISCHARGE INSTRUCTIONS
Are advised to follow up with her primary care provider for re-evaluation within 3 days.  You are instructed to return to the emergency department immediately for any new or worsening symptoms.

## 2019-11-06 ENCOUNTER — HOSPITAL ENCOUNTER (EMERGENCY)
Facility: HOSPITAL | Age: 34
Discharge: HOME | End: 2019-11-06
Attending: EMERGENCY MEDICINE
Payer: COMMERCIAL

## 2019-11-06 VITALS
WEIGHT: 150 LBS | BODY MASS INDEX: 28.32 KG/M2 | RESPIRATION RATE: 12 BRPM | OXYGEN SATURATION: 99 % | DIASTOLIC BLOOD PRESSURE: 80 MMHG | HEART RATE: 72 BPM | HEIGHT: 61 IN | TEMPERATURE: 98 F | SYSTOLIC BLOOD PRESSURE: 140 MMHG

## 2019-11-06 DIAGNOSIS — J32.1 FRONTAL SINUSITIS, UNSPECIFIED CHRONICITY: ICD-10-CM

## 2019-11-06 DIAGNOSIS — B34.9 VIRAL SYNDROME: Primary | ICD-10-CM

## 2019-11-06 DIAGNOSIS — J06.9 UPPER RESPIRATORY TRACT INFECTION, UNSPECIFIED TYPE: ICD-10-CM

## 2019-11-06 LAB
INFLUENZA A, MOLECULAR: NEGATIVE
INFLUENZA B, MOLECULAR: NEGATIVE
SPECIMEN SOURCE: NORMAL

## 2019-11-06 PROCEDURE — 99284 EMERGENCY DEPT VISIT MOD MDM: CPT | Mod: ER

## 2019-11-06 PROCEDURE — 87502 INFLUENZA DNA AMP PROBE: CPT | Mod: ER

## 2019-11-06 RX ORDER — AMOXICILLIN AND CLAVULANATE POTASSIUM 875; 125 MG/1; MG/1
1 TABLET, FILM COATED ORAL EVERY 12 HOURS
Qty: 20 TABLET | Refills: 0 | Status: SHIPPED | OUTPATIENT
Start: 2019-11-06 | End: 2019-11-16

## 2019-11-06 RX ORDER — PREDNISONE 50 MG/1
50 TABLET ORAL DAILY
Qty: 5 TABLET | Refills: 0 | Status: SHIPPED | OUTPATIENT
Start: 2019-11-06 | End: 2019-11-11

## 2019-11-06 RX ORDER — FLUTICASONE PROPIONATE 50 MCG
1 SPRAY, SUSPENSION (ML) NASAL 2 TIMES DAILY PRN
Qty: 15 G | Refills: 0 | Status: SHIPPED | OUTPATIENT
Start: 2019-11-06 | End: 2020-06-03

## 2019-11-07 NOTE — DISCHARGE INSTRUCTIONS
Supportive treatment drink plenty of fluids, corcidin BP for congestion and medications as prescribed.  Follow up with PMD this week return if any

## 2019-11-07 NOTE — ED PROVIDER NOTES
Encounter Date: 11/6/2019       History     Chief Complaint   Patient presents with    Influenza     Generalized body aches, headaches, cough, decreased appetite and chills for 1 week. Patient did not take any medication within the last 4 hrs for symptoms.      33 yo female presenting to ER for complaint of body ache congestion for past 8 days. Pt states has been sick and trying to go to work and not improving now ear pain and pressure.  Sinus pain dizziness and bad breath.  Pt states still having body aches and low grade fever.        Review of patient's allergies indicates:  No Known Allergies  Past Medical History:   Diagnosis Date    CHF (congestive heart failure)     Hypertension     Stroke      History reviewed. No pertinent surgical history.  History reviewed. No pertinent family history.  Social History     Tobacco Use    Smoking status: Never Smoker    Smokeless tobacco: Never Used   Substance Use Topics    Alcohol use: Yes     Comment: occasionally    Drug use: No     Review of Systems   Constitutional: Positive for fatigue.   HENT: Positive for congestion, ear pain, postnasal drip, rhinorrhea, sinus pressure and sinus pain.    Respiratory: Positive for cough. Negative for chest tightness.    Cardiovascular: Negative.    Musculoskeletal: Positive for myalgias.   Skin: Negative for rash.   All other systems reviewed and are negative.      Physical Exam     Initial Vitals [11/06/19 1926]   BP Pulse Resp Temp SpO2   130/85 79 18 98.8 °F (37.1 °C) 99 %      MAP       --         Physical Exam    Nursing note and vitals reviewed.  Constitutional: She appears well-developed and well-nourished.   HENT:   Head: Normocephalic and atraumatic.   Nose: Nose normal.   Mouth/Throat: Oropharynx is clear and moist.   Frontal sinus tenderness    Eyes: Conjunctivae and EOM are normal. Pupils are equal, round, and reactive to light.   Neck: Normal range of motion. Neck supple.   Cardiovascular: Normal rate and regular  rhythm.   Pulmonary/Chest: Breath sounds normal. No respiratory distress. She has no wheezes. She has no rales.   Abdominal: Soft. Bowel sounds are normal.   Musculoskeletal: Normal range of motion.   Neurological: She is alert and oriented to person, place, and time.   Skin: Skin is warm and dry.   Psychiatric: She has a normal mood and affect. Thought content normal.         ED Course   Procedures  Labs Reviewed   INFLUENZA A & B BY MOLECULAR          Imaging Results    None          Medical Decision Making:   Differential Diagnosis:   Influenza, uri sinus infection pneumonia                                  Clinical Impression:       ICD-10-CM ICD-9-CM   1. Viral syndrome B34.9 079.99   2. Upper respiratory tract infection, unspecified type J06.9 465.9   3. Frontal sinusitis, unspecified chronicity J32.1 473.1         Disposition:   Disposition: Discharged  Condition: Stable                     Jeannette Patiño PA-C  11/06/19 2206       Jeannette Patiño PA-C  11/06/19 2224

## 2019-11-16 ENCOUNTER — OFFICE VISIT (OUTPATIENT)
Dept: FAMILY MEDICINE | Facility: CLINIC | Age: 34
End: 2019-11-16
Payer: COMMERCIAL

## 2019-11-16 VITALS
HEIGHT: 61 IN | BODY MASS INDEX: 33.84 KG/M2 | TEMPERATURE: 99 F | WEIGHT: 179.25 LBS | HEART RATE: 88 BPM | OXYGEN SATURATION: 97 % | DIASTOLIC BLOOD PRESSURE: 86 MMHG | SYSTOLIC BLOOD PRESSURE: 114 MMHG

## 2019-11-16 DIAGNOSIS — E66.9 OBESITY (BMI 30-39.9): Primary | ICD-10-CM

## 2019-11-16 DIAGNOSIS — I63.9 CEREBROVASCULAR ACCIDENT (CVA), UNSPECIFIED MECHANISM: ICD-10-CM

## 2019-11-16 DIAGNOSIS — I10 HYPERTENSION, UNSPECIFIED TYPE: ICD-10-CM

## 2019-11-16 DIAGNOSIS — I50.9 CONGESTIVE HEART FAILURE, UNSPECIFIED HF CHRONICITY, UNSPECIFIED HEART FAILURE TYPE: ICD-10-CM

## 2019-11-16 PROCEDURE — 3008F BODY MASS INDEX DOCD: CPT | Mod: CPTII,S$GLB,, | Performed by: FAMILY MEDICINE

## 2019-11-16 PROCEDURE — 99203 OFFICE O/P NEW LOW 30 MIN: CPT | Mod: S$GLB,,, | Performed by: FAMILY MEDICINE

## 2019-11-16 PROCEDURE — 3008F PR BODY MASS INDEX (BMI) DOCUMENTED: ICD-10-PCS | Mod: CPTII,S$GLB,, | Performed by: FAMILY MEDICINE

## 2019-11-16 PROCEDURE — 99203 PR OFFICE/OUTPT VISIT, NEW, LEVL III, 30-44 MIN: ICD-10-PCS | Mod: S$GLB,,, | Performed by: FAMILY MEDICINE

## 2019-11-16 RX ORDER — TOPIRAMATE 25 MG/1
25 TABLET ORAL 2 TIMES DAILY
Qty: 60 TABLET | Refills: 11 | Status: SHIPPED | OUTPATIENT
Start: 2019-11-16 | End: 2020-04-28 | Stop reason: SDUPTHER

## 2019-11-16 NOTE — PROGRESS NOTES
Chief Complaint  Chief Complaint   Patient presents with    [facility]       HPI  Anne Rangel is a 34 y.o. female with multiple medical diagnoses as listed in the medical history and problem list that presents to [facility].  She is concerned about recent weight gain.  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  Her goal is to lose about 30 lbs.  She is following a low carb diet and going to the gym 2-3 days a week.  She says she has taken adipex in the past.  Her blood pressure is well controlled.  17 years ago after the birth of her son she had a dysrythmia and developed CHF.  She is on lasix and spironoloactone.  The dysrythmia resolved and she follows with her cardiologist every 6 months.        PAST MEDICAL HISTORY:  Past Medical History:   Diagnosis Date    CHF (congestive heart failure)     Hypertension     Stroke        PAST SURGICAL HISTORY:  History reviewed. No pertinent surgical history.    SOCIAL HISTORY:  Social History     Socioeconomic History    Marital status: Single     Spouse name: Not on file    Number of children: Not on file    Years of education: Not on file    Highest education level: Not on file   Occupational History    Not on file   Social Needs    Financial resource strain: Not on file    Food insecurity:     Worry: Not on file     Inability: Not on file    Transportation needs:     Medical: Not on file     Non-medical: Not on file   Tobacco Use    Smoking status: Never Smoker    Smokeless tobacco: Never Used   Substance and Sexual Activity    Alcohol use: Yes     Comment: occasionally    Drug use: No    Sexual activity: Not on file   Lifestyle    Physical activity:     Days per week: Not on file     Minutes per session: Not on file    Stress: Not on file   Relationships    Social connections:     Talks on phone: Not on file     Gets together: Not on file     Attends Yarsani service: Not on file      Active member of club or organization: Not on file     Attends meetings of clubs or organizations: Not on file     Relationship status: Not on file   Other Topics Concern    Not on file   Social History Narrative    Not on file       FAMILY HISTORY:  History reviewed. No pertinent family history.    ALLERGIES AND MEDICATIONS: updated and reviewed.  Review of patient's allergies indicates:  No Known Allergies  Current Outpatient Medications   Medication Sig Dispense Refill    aspirin 81 MG Chew Take 81 mg by mouth once daily.      fluticasone propionate (FLONASE) 50 mcg/actuation nasal spray 1 spray (50 mcg total) by Each Nostril route 2 (two) times daily as needed for Rhinitis. 15 g 0    furosemide (LASIX) 80 MG tablet Take 1 tablet (80 mg total) by mouth once daily. 30 tablet 0    ketorolac (TORADOL) 10 mg tablet Take 1 tablet (10 mg total) by mouth every 6 (six) hours. 10 tablet 0    losartan (COZAAR) 50 MG tablet   3    magnesium oxide-pyridoxine (BEELITH) 362-20 mg Tab Take 1 tablet by mouth once.      metoprolol succinate (TOPROL-XL) 50 MG 24 hr tablet Take 1 tablet (50 mg total) by mouth once daily. 30 tablet 1    mupirocin (BACTROBAN) 2 % ointment Apply topically 3 (three) times daily. 15 g 0    potassium chloride SA (K-DUR,KLOR-CON) 20 MEQ tablet   4    spironolactone (ALDACTONE) 50 MG tablet   3    topiramate (TOPAMAX) 25 MG tablet Take 1 tablet (25 mg total) by mouth 2 (two) times daily. 60 tablet 11     No current facility-administered medications for this visit.          ROS  Review of Systems   Constitutional: Negative for activity change, appetite change, chills and fatigue.   HENT: Negative for congestion, ear discharge, ear pain, rhinorrhea, sinus pain, sore throat and trouble swallowing.    Eyes: Negative for photophobia, pain, redness, itching and visual disturbance.   Respiratory: Negative for cough, chest tightness, shortness of breath and wheezing.    Cardiovascular: Negative  "for chest pain, palpitations and leg swelling.   Gastrointestinal: Negative for abdominal distention, abdominal pain, blood in stool, diarrhea, nausea and vomiting.   Genitourinary: Negative for dysuria, pelvic pain, vaginal bleeding, vaginal discharge and vaginal pain.   Musculoskeletal: Negative for arthralgias, back pain, gait problem and neck pain.   Skin: Negative for color change, pallor and rash.   Neurological: Negative for dizziness, tremors, weakness, light-headedness, numbness and headaches.   Psychiatric/Behavioral: Negative for agitation, behavioral problems, confusion and sleep disturbance.           PHYSICAL EXAM  Vitals:    11/16/19 0937   BP: 114/86   Pulse: 88   Temp: 98.6 °F (37 °C)   SpO2: 97%   Weight: 81.3 kg (179 lb 3.7 oz)   Height: 5' 1" (1.549 m)    Body mass index is 33.87 kg/m².  Weight: 81.3 kg (179 lb 3.7 oz)   Height: 5' 1" (154.9 cm)     Physical Exam   Constitutional: She appears well-developed and well-nourished.   HENT:   Head: Normocephalic.   Eyes: Conjunctivae are normal.   Neck: Normal range of motion. Neck supple.   Cardiovascular: Normal rate, regular rhythm and normal heart sounds.   Pulmonary/Chest: Effort normal and breath sounds normal.   Neurological: She is alert.   Skin: Skin is warm and dry.   Psychiatric: Her behavior is normal.         Health Maintenance       Date Due Completion Date    Influenza Vaccine (1) 11/30/2019 (Originally 9/1/2019) ---    Pap Smear with HPV Cotest 12/31/2019 (Originally 2/23/2005) ---    Lipid Panel 12/31/2019 (Originally 1984) ---    Aspirin/Antiplatelet Therapy 11/16/2020 11/16/2019    TETANUS VACCINE 07/07/2029 7/7/2019            Assessment & Plan      Anne was seen today for establish care.    Diagnoses and all orders for this visit:    Obesity (BMI 30-39.9)  -     topiramate (TOPAMAX) 25 MG tablet; Take 1 tablet (25 mg total) by mouth 2 (two) times daily.  - We can add adipex if she is cleared by her cardiologist for this.  "   - Please keep a diet and exercise journal.      Congestive heart failure, unspecified HF chronicity, unspecified heart failure type    Hypertension, unspecified type    Cerebrovascular accident (CVA), unspecified mechanism          Follow-up: No follow-ups on file.

## 2019-12-19 ENCOUNTER — OFFICE VISIT (OUTPATIENT)
Dept: FAMILY MEDICINE | Facility: CLINIC | Age: 34
End: 2019-12-19
Payer: COMMERCIAL

## 2019-12-19 VITALS
WEIGHT: 165.88 LBS | SYSTOLIC BLOOD PRESSURE: 100 MMHG | DIASTOLIC BLOOD PRESSURE: 70 MMHG | TEMPERATURE: 98 F | HEART RATE: 85 BPM | BODY MASS INDEX: 31.32 KG/M2 | HEIGHT: 61 IN | OXYGEN SATURATION: 98 %

## 2019-12-19 DIAGNOSIS — E66.9 OBESITY (BMI 30-39.9): ICD-10-CM

## 2019-12-19 PROCEDURE — 3008F BODY MASS INDEX DOCD: CPT | Mod: CPTII,S$GLB,, | Performed by: FAMILY MEDICINE

## 2019-12-19 PROCEDURE — 3008F PR BODY MASS INDEX (BMI) DOCUMENTED: ICD-10-PCS | Mod: CPTII,S$GLB,, | Performed by: FAMILY MEDICINE

## 2019-12-19 PROCEDURE — 99213 PR OFFICE/OUTPT VISIT, EST, LEVL III, 20-29 MIN: ICD-10-PCS | Mod: S$GLB,,, | Performed by: FAMILY MEDICINE

## 2019-12-19 PROCEDURE — 99213 OFFICE O/P EST LOW 20 MIN: CPT | Mod: S$GLB,,, | Performed by: FAMILY MEDICINE

## 2019-12-19 RX ORDER — PHENTERMINE HYDROCHLORIDE 37.5 MG/1
37.5 TABLET ORAL
Qty: 30 TABLET | Refills: 2 | Status: SHIPPED | OUTPATIENT
Start: 2019-12-19 | End: 2020-01-18

## 2019-12-19 NOTE — PROGRESS NOTES
Chief Complaint  No chief complaint on file.      HPI  Anne Rangel is a 34 y.o. female with multiple medical diagnoses as listed in the medical history and problem list that presents for f/u.  She is concerned about recent weight gain.  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  Her goal is to lose about 30 lbs. Over the past month she has lost about 15 lbs with Topamax. She has changed to a vegetarian diet.   She is tolerating this well, but would like to add adipex to that.        Her blood pressure is well controlled.  17 years ago after the birth of her son she had a dysrythmia and developed CHF.  She is on lasix and spironoloactone.  The dysrythmia resolved and she follows with her cardiologist every 6 months.        PAST MEDICAL HISTORY:  Past Medical History:   Diagnosis Date    CHF (congestive heart failure)     Hypertension     Stroke        PAST SURGICAL HISTORY:  No past surgical history on file.    SOCIAL HISTORY:  Social History     Socioeconomic History    Marital status: Single     Spouse name: Not on file    Number of children: Not on file    Years of education: Not on file    Highest education level: Not on file   Occupational History    Not on file   Social Needs    Financial resource strain: Not on file    Food insecurity:     Worry: Not on file     Inability: Not on file    Transportation needs:     Medical: Not on file     Non-medical: Not on file   Tobacco Use    Smoking status: Never Smoker    Smokeless tobacco: Never Used   Substance and Sexual Activity    Alcohol use: Yes     Comment: occasionally    Drug use: No    Sexual activity: Not on file   Lifestyle    Physical activity:     Days per week: Not on file     Minutes per session: Not on file    Stress: Not on file   Relationships    Social connections:     Talks on phone: Not on file     Gets together: Not on file     Attends Rastafarian service: Not on file      Active member of club or organization: Not on file     Attends meetings of clubs or organizations: Not on file     Relationship status: Not on file   Other Topics Concern    Not on file   Social History Narrative    Not on file       FAMILY HISTORY:  No family history on file.    ALLERGIES AND MEDICATIONS: updated and reviewed.  Review of patient's allergies indicates:  No Known Allergies  Current Outpatient Medications   Medication Sig Dispense Refill    aspirin 81 MG Chew Take 81 mg by mouth once daily.      fluticasone propionate (FLONASE) 50 mcg/actuation nasal spray 1 spray (50 mcg total) by Each Nostril route 2 (two) times daily as needed for Rhinitis. 15 g 0    furosemide (LASIX) 80 MG tablet Take 1 tablet (80 mg total) by mouth once daily. 30 tablet 0    ketorolac (TORADOL) 10 mg tablet Take 1 tablet (10 mg total) by mouth every 6 (six) hours. 10 tablet 0    losartan (COZAAR) 50 MG tablet   3    magnesium oxide-pyridoxine (BEELITH) 362-20 mg Tab Take 1 tablet by mouth once.      metoprolol succinate (TOPROL-XL) 50 MG 24 hr tablet Take 1 tablet (50 mg total) by mouth once daily. 30 tablet 1    mupirocin (BACTROBAN) 2 % ointment Apply topically 3 (three) times daily. 15 g 0    potassium chloride SA (K-DUR,KLOR-CON) 20 MEQ tablet   4    spironolactone (ALDACTONE) 50 MG tablet   3    topiramate (TOPAMAX) 25 MG tablet Take 1 tablet (25 mg total) by mouth 2 (two) times daily. 60 tablet 11     No current facility-administered medications for this visit.          ROS  Review of Systems   Constitutional: Negative for activity change, appetite change, chills and fatigue.   HENT: Negative for congestion, ear discharge, ear pain, rhinorrhea, sinus pain, sore throat and trouble swallowing.    Eyes: Negative for photophobia, pain, redness, itching and visual disturbance.   Respiratory: Negative for cough, chest tightness, shortness of breath and wheezing.    Cardiovascular: Negative for chest pain,  "palpitations and leg swelling.   Gastrointestinal: Negative for abdominal distention, abdominal pain, blood in stool, diarrhea, nausea and vomiting.   Genitourinary: Negative for dysuria, pelvic pain, vaginal bleeding, vaginal discharge and vaginal pain.   Musculoskeletal: Negative for arthralgias, back pain, gait problem and neck pain.   Skin: Negative for color change, pallor and rash.   Neurological: Negative for dizziness, tremors, weakness, light-headedness, numbness and headaches.   Psychiatric/Behavioral: Negative for agitation, behavioral problems, confusion and sleep disturbance.           PHYSICAL EXAM    /70 (BP Location: Left arm, Patient Position: Sitting)   Pulse 85   Temp 98.4 °F (36.9 °C) (Oral)   Ht 5' 1" (1.549 m)   Wt 75.3 kg (165 lb 14.3 oz)   SpO2 98%   BMI 31.35 kg/m²           Physical Exam   Constitutional: She appears well-developed and well-nourished.   HENT:   Head: Normocephalic.   Eyes: Conjunctivae are normal.   Neck: Normal range of motion. Neck supple.   Cardiovascular: Normal rate, regular rhythm and normal heart sounds.   Pulmonary/Chest: Effort normal and breath sounds normal.   Neurological: She is alert.   Skin: Skin is warm and dry.   Psychiatric: Her behavior is normal.         Health Maintenance       Date Due Completion Date    Influenza Vaccine (1) 11/30/2019 (Originally 9/1/2019) ---    Pap Smear with HPV Cotest 12/31/2019 (Originally 2/23/2005) ---    Lipid Panel 12/31/2019 (Originally 1984) ---    Aspirin/Antiplatelet Therapy 11/16/2020 11/16/2019    TETANUS VACCINE 07/07/2029 7/7/2019            Assessment & Plan      Anne was seen today for establish care.    Diagnoses and all orders for this visit:    Obesity (BMI 30-39.9)  -     phentermine (ADIPEX-P) 37.5 mg tablet; Take 1 tablet (37.5 mg total) by mouth before breakfast.  Dispense: 30 tablet; Refill: 2                Follow-up: No follow-ups on file.    "

## 2020-03-27 ENCOUNTER — TELEPHONE (OUTPATIENT)
Dept: FAMILY MEDICINE | Facility: CLINIC | Age: 35
End: 2020-03-27

## 2020-03-27 ENCOUNTER — OFFICE VISIT (OUTPATIENT)
Dept: URGENT CARE | Facility: CLINIC | Age: 35
End: 2020-03-27
Payer: COMMERCIAL

## 2020-03-27 ENCOUNTER — OFFICE VISIT (OUTPATIENT)
Dept: FAMILY MEDICINE | Facility: CLINIC | Age: 35
End: 2020-03-27
Payer: COMMERCIAL

## 2020-03-27 VITALS
HEIGHT: 61 IN | RESPIRATION RATE: 18 BRPM | HEART RATE: 90 BPM | WEIGHT: 166 LBS | DIASTOLIC BLOOD PRESSURE: 67 MMHG | OXYGEN SATURATION: 100 % | BODY MASS INDEX: 31.34 KG/M2 | TEMPERATURE: 98 F | SYSTOLIC BLOOD PRESSURE: 113 MMHG

## 2020-03-27 DIAGNOSIS — J06.9 VIRAL URI WITH COUGH: Primary | ICD-10-CM

## 2020-03-27 DIAGNOSIS — R06.02 SHORTNESS OF BREATH: ICD-10-CM

## 2020-03-27 DIAGNOSIS — R50.9 FEVER, UNSPECIFIED FEVER CAUSE: Primary | ICD-10-CM

## 2020-03-27 DIAGNOSIS — R52 BODY ACHES: ICD-10-CM

## 2020-03-27 LAB
CTP QC/QA: YES
FLUAV AG NPH QL: NEGATIVE
FLUBV AG NPH QL: NEGATIVE

## 2020-03-27 PROCEDURE — 87804 POCT INFLUENZA A/B: ICD-10-PCS | Mod: 59,QW,S$GLB, | Performed by: NURSE PRACTITIONER

## 2020-03-27 PROCEDURE — 99213 PR OFFICE/OUTPT VISIT, EST, LEVL III, 20-29 MIN: ICD-10-PCS | Mod: 95,,, | Performed by: FAMILY MEDICINE

## 2020-03-27 PROCEDURE — 87804 INFLUENZA ASSAY W/OPTIC: CPT | Mod: QW,S$GLB,, | Performed by: NURSE PRACTITIONER

## 2020-03-27 PROCEDURE — 99213 OFFICE O/P EST LOW 20 MIN: CPT | Mod: 95,,, | Performed by: FAMILY MEDICINE

## 2020-03-27 PROCEDURE — 71046 X-RAY EXAM CHEST 2 VIEWS: CPT | Mod: S$GLB,,, | Performed by: RADIOLOGY

## 2020-03-27 PROCEDURE — 99214 OFFICE O/P EST MOD 30 MIN: CPT | Mod: 25,S$GLB,, | Performed by: NURSE PRACTITIONER

## 2020-03-27 PROCEDURE — 99214 PR OFFICE/OUTPT VISIT, EST, LEVL IV, 30-39 MIN: ICD-10-PCS | Mod: 25,S$GLB,, | Performed by: NURSE PRACTITIONER

## 2020-03-27 PROCEDURE — 71046 XR CHEST PA AND LATERAL: ICD-10-PCS | Mod: S$GLB,,, | Performed by: RADIOLOGY

## 2020-03-27 RX ORDER — BENZONATATE 100 MG/1
100 CAPSULE ORAL EVERY 6 HOURS PRN
Qty: 30 CAPSULE | Refills: 0 | Status: SHIPPED | OUTPATIENT
Start: 2020-03-27 | End: 2020-04-07 | Stop reason: SDUPTHER

## 2020-03-27 NOTE — LETTER
March 27, 2020      [facility] [address] [place] [address]  Phone: [phone]  Fax: [phone]       Patient: Anne Rangel   YOB: 1985  Date of Visit: 03/27/2020    To Whom It May Concern:    Loki Rangel  was at [facility] on 03/27/2020. She may return to work/school on 3/30/2020 with no restrictions. If you have any questions or concerns, or if I can be of further assistance, please do not hesitate to contact me.    Sincerely,    Rayne Orozco NP

## 2020-03-27 NOTE — PROGRESS NOTES
"Subjective:       Patient ID: Anne Rangel is a 35 y.o. female.    Vitals:  height is 5' 1" (1.549 m) and weight is 75.3 kg (166 lb 0.1 oz). Her oral temperature is 98.3 °F (36.8 °C). Her blood pressure is 113/67 and her pulse is 90. Her respiration is 18 and oxygen saturation is 100%.     Chief Complaint: Cough    Patient here today with c/o dry cough, and Right ear pain    Cough   This is a new problem. The current episode started in the past 7 days. The problem has been unchanged. The cough is non-productive. Associated symptoms include ear pain. Pertinent negatives include no chest pain, chills, ear congestion, eye redness, fever, headaches, heartburn, hemoptysis, myalgias, nasal congestion, postnasal drip, rash, rhinorrhea, sore throat, shortness of breath, sweats, weight loss or wheezing. Nothing aggravates the symptoms. She has tried nothing for the symptoms. The treatment provided no relief. There is no history of asthma, bronchiectasis, bronchitis, COPD, emphysema, environmental allergies or pneumonia.       Constitution: Negative for chills, sweating, fatigue and fever.   HENT: Positive for ear pain. Negative for congestion, postnasal drip, sinus pain, sinus pressure, sore throat and voice change.    Neck: Negative for painful lymph nodes.   Cardiovascular: Negative for chest pain.   Eyes: Negative for eye redness.   Respiratory: Positive for cough. Negative for chest tightness, sputum production, bloody sputum, COPD, shortness of breath, stridor, wheezing and asthma.    Gastrointestinal: Negative for nausea, vomiting and heartburn.   Musculoskeletal: Negative for muscle ache.   Skin: Negative for rash.   Allergic/Immunologic: Negative for environmental allergies, seasonal allergies and asthma.   Neurological: Negative for headaches.   Hematologic/Lymphatic: Negative for swollen lymph nodes.       Objective:      Physical Exam    due to the state of emergency this visit has been done remotely as per " [facility] protocol.     X-ray Chest Pa And Lateral    Result Date: 3/27/2020  EXAMINATION: XR CHEST PA AND LATERAL CLINICAL HISTORY: Shortness of breath TECHNIQUE: PA and lateral views of the chest were performed. COMPARISON: Chest radiograph 06/09/2019 FINDINGS: No detrimental change.The lungs are clear, with normal appearance of pulmonary vasculature and no pleural effusion or pneumothorax. The cardiac silhouette is normal in size. The hilar and mediastinal contours are unremarkable. Bones are intact.     Normal radiographic appearance of the chest. Electronically signed by: Percy Pride MD Date:    03/27/2020 Time:    14:38  Results for orders placed or performed in visit on 03/27/20   POCT Influenza A/B   Result Value Ref Range    Rapid Influenza A Ag Negative Negative    Rapid Influenza B Ag Negative Negative     Acceptable Yes      Assessment:       1. Viral URI with cough    2. Body aches    3. Shortness of breath        Plan:         Viral URI with cough    Body aches  -     POCT Influenza A/B    Shortness of breath  -     X-Ray Chest PA And Lateral; Future; Expected date: 03/27/2020    At this time is not found that you need to be tested for covid 19.  If you start to develop worsening of fever or cough and the next 2-14 days please log on to [facility] for further guidance and reassessment.  At this time your thought to be low risk.    Patient Instructions   At this time is not found that you need to be tested for covid 19.  If you start to develop worsening of fever or cough and the next 2-14 days please log on to [facility] for further guidance and reassessment.  At this time your thought to be low risk.    Please continue infection control precautions like covering your mouth when coughing, washing hands frequently and minimizing contact with others whenever possible. Current CDC recommendations do not require quarantine if you are feeling OK and haven't had fever in  24 hours.     Use an antihistamine such as Claritin, Zyrtec or Allegra to dry you out.     Take over the counter cough syrup such as delsym or robitussin DM.    Take tylenol or ibuprofen as directed for body aches and fever.     Take tessalon perles as directed for cough supression.     Drink plenty of fluids and get rest. If symptoms persist or worsen follow up immediately with PCP or go to the emergency room immediately for further evaluation.           Viral Upper Respiratory Illness (Adult)  You have a viral upper respiratory illness (URI), which is another term for the common cold. This illness is contagious during the first few days. It is spread through the air by coughing and sneezing. It may also be spread by direct contact (touching the sick person and then touching your own eyes, nose, or mouth). Frequent handwashing will decrease risk of spread. Most viral illnesses go away within 7 to 10 days with rest and simple home remedies. Sometimes the illness may last for several weeks. Antibiotics will not kill a virus, and they are generally not prescribed for this condition.    Home care  · If symptoms are severe, rest at home for the first 2 to 3 days. When you resume activity, don't let yourself get too tired.  · Avoid being exposed to cigarette smoke (yours or others).  · You may use acetaminophen or ibuprofen to control pain and fever, unless another medicine was prescribed. (Note: If you have chronic liver or kidney disease, have ever had a stomach ulcer or gastrointestinal bleeding, or are taking blood-thinning medicines, talk with your healthcare provider before using these medicines.) Aspirin should never be given to anyone under 18 years of age who is ill with a viral infection or fever. It may cause severe liver or brain damage.  · Your appetite may be poor, so a light diet is fine. Avoid dehydration by drinking 6 to 8 glasses of fluids per day (water, soft drinks, juices, tea, or soup). Extra  fluids will help loosen secretions in the nose and lungs.  · Over-the-counter cold medicines will not shorten the length of time youre sick, but they may be helpful for the following symptoms: cough, sore throat, and nasal and sinus congestion. (Note: Do not use decongestants if you have high blood pressure.)  Follow-up care  Follow up with your healthcare provider, or as advised.  When to seek medical advice  Call your healthcare provider right away if any of these occur:  · Cough with lots of colored sputum (mucus)  · Severe headache; face, neck, or ear pain  · Difficulty swallowing due to throat pain  · Fever of 100.4°F (38°C)  Call 911, or get immediate medical care  Call emergency services right away if any of these occur:  · Chest pain, shortness of breath, wheezing, or difficulty breathing  · Coughing up blood  · Inability to swallow due to throat pain  Date Last Reviewed: 9/13/2015  © 5858-1778 [employer]. [address] [place] [address], [place] [address]. All rights reserved. This information is not intended as a substitute for professional medical care. Always follow your healthcare professional's instructions.

## 2020-03-27 NOTE — PROGRESS NOTES
The patient location is: urgent care  The chief complaint leading to consultation is: fever  Visit type: Virtual visit with synchronous audio and video  Total time spent with patient: 5 min  Each patient to whom he or she provides medical services by telemedicine is:  (1) informed of the relationship between the physician and patient and the respective role of any other health care provider with respect to management of the patient; and (2) notified that he or she may decline to receive medical services by telemedicine and may withdraw from such care at any time.    Notes:       Subjective:       Patient ID: Anne Rangel is a 35 y.o. female.    Chief Complaint: fever  Fever    This is a new (Was seen in the ER yesterday, they recommended that she see her PCP and be checked for COVID.  In the meantime Anne decided to go to urgent care.  She was in the waiting room at urgent care during this virtual visit. ) problem. The current episode started in the past 7 days. The problem occurs constantly. The maximum temperature noted was 99 to 99.9 F. Temperature source: unknown. Associated symptoms include congestion, coughing, ear pain, headaches, nausea and a sore throat. Pertinent negatives include no abdominal pain, chest pain, diarrhea, muscle aches, rash, sleepiness, urinary pain, vomiting or wheezing. She has tried acetaminophen for the symptoms. The treatment provided mild relief.   Risk factors: sick contacts      Review of Systems   Constitutional: Positive for fever. Negative for fatigue.   HENT: Positive for congestion, ear pain and sore throat. Negative for postnasal drip, rhinorrhea, sinus pressure and voice change.    Eyes: Negative for discharge.   Respiratory: Positive for cough. Negative for chest tightness, shortness of breath and wheezing.    Cardiovascular: Negative for chest pain.   Gastrointestinal: Positive for nausea. Negative for abdominal pain, diarrhea and vomiting.   Genitourinary: Negative  for dysuria.   Skin: Negative for rash.   Neurological: Positive for headaches.     Assessment:       Fever, unspecified fever cause       - Since she is at urgent care and ready to be seen, I will let them evaluate her and decide if testing is appropriate.

## 2020-03-27 NOTE — PATIENT INSTRUCTIONS
At this time is not found that you need to be tested for covid 19.  If you start to develop worsening of fever or cough and the next 2-14 days please log on to [facility] for further guidance and reassessment.  At this time your thought to be low risk.    Please continue infection control precautions like covering your mouth when coughing, washing hands frequently and minimizing contact with others whenever possible. Current CDC recommendations do not require quarantine if you are feeling OK and haven't had fever in 24 hours.     Use an antihistamine such as Claritin, Zyrtec or Allegra to dry you out.     Take over the counter cough syrup such as delsym or robitussin DM.    Take tylenol or ibuprofen as directed for body aches and fever.     Take tessalon perles as directed for cough supression.     Drink plenty of fluids and get rest. If symptoms persist or worsen follow up immediately with PCP or go to the emergency room immediately for further evaluation.           Viral Upper Respiratory Illness (Adult)  You have a viral upper respiratory illness (URI), which is another term for the common cold. This illness is contagious during the first few days. It is spread through the air by coughing and sneezing. It may also be spread by direct contact (touching the sick person and then touching your own eyes, nose, or mouth). Frequent handwashing will decrease risk of spread. Most viral illnesses go away within 7 to 10 days with rest and simple home remedies. Sometimes the illness may last for several weeks. Antibiotics will not kill a virus, and they are generally not prescribed for this condition.    Home care  · If symptoms are severe, rest at home for the first 2 to 3 days. When you resume activity, don't let yourself get too tired.  · Avoid being exposed to cigarette smoke (yours or others).  · You may use acetaminophen or ibuprofen to control pain and fever, unless another medicine was prescribed. (Note:  If you have chronic liver or kidney disease, have ever had a stomach ulcer or gastrointestinal bleeding, or are taking blood-thinning medicines, talk with your healthcare provider before using these medicines.) Aspirin should never be given to anyone under 18 years of age who is ill with a viral infection or fever. It may cause severe liver or brain damage.  · Your appetite may be poor, so a light diet is fine. Avoid dehydration by drinking 6 to 8 glasses of fluids per day (water, soft drinks, juices, tea, or soup). Extra fluids will help loosen secretions in the nose and lungs.  · Over-the-counter cold medicines will not shorten the length of time youre sick, but they may be helpful for the following symptoms: cough, sore throat, and nasal and sinus congestion. (Note: Do not use decongestants if you have high blood pressure.)  Follow-up care  Follow up with your healthcare provider, or as advised.  When to seek medical advice  Call your healthcare provider right away if any of these occur:  · Cough with lots of colored sputum (mucus)  · Severe headache; face, neck, or ear pain  · Difficulty swallowing due to throat pain  · Fever of 100.4°F (38°C)  Call 911, or get immediate medical care  Call emergency services right away if any of these occur:  · Chest pain, shortness of breath, wheezing, or difficulty breathing  · Coughing up blood  · Inability to swallow due to throat pain  Date Last Reviewed: 9/13/2015  © 8520-8483 [employer]. [address] [place] [address], [place] [address]. All rights reserved. This information is not intended as a substitute for professional medical care. Always follow your healthcare professional's instructions.

## 2020-04-06 ENCOUNTER — HOSPITAL ENCOUNTER (EMERGENCY)
Facility: HOSPITAL | Age: 35
Discharge: HOME | End: 2020-04-07
Attending: EMERGENCY MEDICINE
Payer: COMMERCIAL

## 2020-04-06 DIAGNOSIS — R05.9 COUGH: ICD-10-CM

## 2020-04-06 DIAGNOSIS — J06.9 VIRAL URI WITH COUGH: ICD-10-CM

## 2020-04-06 DIAGNOSIS — R06.00 DYSPNEA: ICD-10-CM

## 2020-04-06 DIAGNOSIS — U07.1 COVID-19 VIRUS INFECTION: Primary | ICD-10-CM

## 2020-04-06 LAB
ALBUMIN SERPL BCP-MCNC: 4 G/DL (ref 3.5–5.2)
ALP SERPL-CCNC: 55 U/L (ref 38–126)
ALT SERPL W/O P-5'-P-CCNC: 25 U/L (ref 10–44)
ANION GAP SERPL CALC-SCNC: 9 MMOL/L (ref 8–16)
AST SERPL-CCNC: 29 U/L (ref 15–46)
BILIRUB SERPL-MCNC: 0.3 MG/DL (ref 0.1–1)
BUN SERPL-MCNC: 8 MG/DL (ref 7–17)
CALCIUM SERPL-MCNC: 8.6 MG/DL (ref 8.7–10.5)
CHLORIDE SERPL-SCNC: 103 MMOL/L (ref 95–110)
CO2 SERPL-SCNC: 28 MMOL/L (ref 23–29)
CREAT SERPL-MCNC: 0.52 MG/DL (ref 0.5–1.4)
EST. GFR  (AFRICAN AMERICAN): >60 ML/MIN/1.73 M^2
EST. GFR  (NON AFRICAN AMERICAN): >60 ML/MIN/1.73 M^2
GLUCOSE SERPL-MCNC: 94 MG/DL (ref 70–110)
POTASSIUM SERPL-SCNC: 3.2 MMOL/L (ref 3.5–5.1)
PROT SERPL-MCNC: 7.2 G/DL (ref 6–8.4)
SARS-COV-2 RDRP RESP QL NAA+PROBE: POSITIVE
SODIUM SERPL-SCNC: 140 MMOL/L (ref 136–145)

## 2020-04-06 PROCEDURE — 85025 COMPLETE CBC W/AUTO DIFF WBC: CPT | Mod: ER

## 2020-04-06 PROCEDURE — 80053 COMPREHEN METABOLIC PANEL: CPT | Mod: ER

## 2020-04-06 PROCEDURE — U0002 COVID-19 LAB TEST NON-CDC: HCPCS | Mod: ER

## 2020-04-06 PROCEDURE — 84484 ASSAY OF TROPONIN QUANT: CPT | Mod: ER

## 2020-04-06 PROCEDURE — 83880 ASSAY OF NATRIURETIC PEPTIDE: CPT | Mod: ER

## 2020-04-06 PROCEDURE — 99285 EMERGENCY DEPT VISIT HI MDM: CPT | Mod: 25,ER

## 2020-04-07 ENCOUNTER — TELEPHONE (OUTPATIENT)
Dept: FAMILY MEDICINE | Facility: CLINIC | Age: 35
End: 2020-04-07

## 2020-04-07 ENCOUNTER — OFFICE VISIT (OUTPATIENT)
Dept: FAMILY MEDICINE | Facility: CLINIC | Age: 35
End: 2020-04-07
Payer: COMMERCIAL

## 2020-04-07 ENCOUNTER — VITALS (OUTPATIENT)
Dept: INTERNAL MEDICINE | Facility: CLINIC | Age: 35
End: 2020-04-07
Payer: COMMERCIAL

## 2020-04-07 VITALS — OXYGEN SATURATION: 98 % | RESPIRATION RATE: 24 BRPM | HEART RATE: 88 BPM

## 2020-04-07 VITALS
HEART RATE: 75 BPM | TEMPERATURE: 98 F | RESPIRATION RATE: 26 BRPM | BODY MASS INDEX: 31.15 KG/M2 | SYSTOLIC BLOOD PRESSURE: 131 MMHG | OXYGEN SATURATION: 98 % | HEIGHT: 61 IN | WEIGHT: 165 LBS | DIASTOLIC BLOOD PRESSURE: 87 MMHG

## 2020-04-07 DIAGNOSIS — R06.02 SOB (SHORTNESS OF BREATH): ICD-10-CM

## 2020-04-07 DIAGNOSIS — U07.1 COVID-19 VIRUS INFECTION: Primary | ICD-10-CM

## 2020-04-07 DIAGNOSIS — U07.1 COVID-19 VIRUS INFECTION: ICD-10-CM

## 2020-04-07 DIAGNOSIS — H92.01 OTALGIA OF RIGHT EAR: Primary | ICD-10-CM

## 2020-04-07 LAB
BASOPHILS # BLD AUTO: 0.02 K/UL (ref 0–0.2)
BASOPHILS NFR BLD: 0.5 % (ref 0–1.9)
DIFFERENTIAL METHOD: ABNORMAL
EOSINOPHIL # BLD AUTO: 0 K/UL (ref 0–0.5)
EOSINOPHIL NFR BLD: 0 % (ref 0–8)
ERYTHROCYTE [DISTWIDTH] IN BLOOD BY AUTOMATED COUNT: 14.4 % (ref 11.5–14.5)
HCT VFR BLD AUTO: 38.4 % (ref 37–48.5)
HGB BLD-MCNC: 12.7 G/DL (ref 12–16)
IMM GRANULOCYTES # BLD AUTO: 0.01 K/UL (ref 0–0.04)
IMM GRANULOCYTES NFR BLD AUTO: 0.3 % (ref 0–0.5)
LYMPHOCYTES # BLD AUTO: 2.6 K/UL (ref 1–4.8)
LYMPHOCYTES NFR BLD: 66.4 % (ref 18–48)
MCH RBC QN AUTO: 30.5 PG (ref 27–31)
MCHC RBC AUTO-ENTMCNC: 33.1 G/DL (ref 32–36)
MCV RBC AUTO: 92 FL (ref 82–98)
MONOCYTES # BLD AUTO: 0.4 K/UL (ref 0.3–1)
MONOCYTES NFR BLD: 10.7 % (ref 4–15)
NEUTROPHILS # BLD AUTO: 0.9 K/UL (ref 1.8–7.7)
NEUTROPHILS NFR BLD: 22.1 % (ref 38–73)
NRBC BLD-RTO: 0 /100 WBC
NT-PROBNP: 67 PG/ML (ref 5–450)
PLATELET # BLD AUTO: 197 K/UL (ref 150–350)
PMV BLD AUTO: 12.6 FL (ref 9.2–12.9)
RBC # BLD AUTO: 4.17 M/UL (ref 4–5.4)
TROPONIN I SERPL DL<=0.01 NG/ML-MCNC: <0.012 NG/ML (ref 0.01–0.03)
WBC # BLD AUTO: 3.84 K/UL (ref 3.9–12.7)

## 2020-04-07 PROCEDURE — 93005 ELECTROCARDIOGRAM TRACING: CPT | Mod: ER

## 2020-04-07 PROCEDURE — 99999 PR PBB SHADOW E&M-EST. PATIENT-LVL II: CPT | Mod: PBBFAC,,,

## 2020-04-07 PROCEDURE — 99213 OFFICE O/P EST LOW 20 MIN: CPT | Mod: 95,,, | Performed by: FAMILY MEDICINE

## 2020-04-07 PROCEDURE — 99213 PR OFFICE/OUTPT VISIT, EST, LEVL III, 20-29 MIN: ICD-10-PCS | Mod: 95,,, | Performed by: FAMILY MEDICINE

## 2020-04-07 PROCEDURE — 99999 PR PBB SHADOW E&M-EST. PATIENT-LVL II: ICD-10-PCS | Mod: PBBFAC,,,

## 2020-04-07 PROCEDURE — 93010 EKG 12-LEAD: ICD-10-PCS | Mod: ,,, | Performed by: INTERNAL MEDICINE

## 2020-04-07 PROCEDURE — 93010 ELECTROCARDIOGRAM REPORT: CPT | Mod: ,,, | Performed by: INTERNAL MEDICINE

## 2020-04-07 RX ORDER — GUAIFENESIN/DEXTROMETHORPHAN 100-10MG/5
5 SYRUP ORAL EVERY 6 HOURS PRN
Qty: 120 ML | Refills: 0 | Status: SHIPPED | OUTPATIENT
Start: 2020-04-07 | End: 2020-04-08 | Stop reason: SDUPTHER

## 2020-04-07 RX ORDER — BENZONATATE 100 MG/1
100 CAPSULE ORAL 3 TIMES DAILY PRN
Qty: 30 CAPSULE | Refills: 0 | Status: SHIPPED | OUTPATIENT
Start: 2020-04-07 | End: 2020-04-08 | Stop reason: SDUPTHER

## 2020-04-07 NOTE — PROGRESS NOTES
[facility] INTERNAL MEDICINE  [facility] Respiratory Check    Date: 04/07/2020  Referred by: Dr. Leana Feliz    Narrative of symptoms: Patient complains of severe chest discomfort, shortness of breath and pain in her chest on coughing.  She went to the ER yesterday but states she was discharged.  She says she was told she has pneumonia.      Vitals:    Pulse: 88    Pulse oximetry (on room air): 98 %.    Respirations: (!) 24    Patient Assessment: stable, will send message to pcp. Patient enrolled in text message for covid support.  Go back to ER if symptoms worsen

## 2020-04-07 NOTE — TELEPHONE ENCOUNTER
----- Message from Shakila Resendez sent at 4/7/2020 12:19 PM CDT -----  Contact:  [phone]  Type:  Needs Medical Advice    Who Called: pt  Advice Regarding: patient tested positive for COVID, having chest pain, weakness, cough, headache  Would the patient rather a call back or a response via [facility]? call  Best Call Back Number: [phone]  Additional Information: Cydney Fuentes      I spoke with the pt and her  and scheduled  Virtual visit today

## 2020-04-07 NOTE — ED PROVIDER NOTES
Encounter Date: 4/6/2020       History     Chief Complaint   Patient presents with    Generalized Body Aches     Pt reports chills, aches, cough, weakness, and SOB for the past week. Pt reports using her albuterol inhaler 2-3x per day.    Weakness    Cough    Shortness of Breath     Patient reports 1 week of generalized body aches weakness cough loss of taste and shortness of breath.  She was seen at urgent care on 03/27/2020 and is been taking over-the-counter treatment without any improvement of her symptoms.  She denies stomach pains does endorse diarrhea.         Review of patient's allergies indicates:  No Known Allergies  Past Medical History:   Diagnosis Date    CHF (congestive heart failure)     Hypertension     Stroke      History reviewed. No pertinent surgical history.  History reviewed. No pertinent family history.  Social History     Tobacco Use    Smoking status: Never Smoker    Smokeless tobacco: Never Used   Substance Use Topics    Alcohol use: Yes     Comment: occasionally    Drug use: No     Review of Systems   Constitutional: Negative for chills and fever.   HENT: Negative for sore throat.    Respiratory: Positive for cough and shortness of breath.    Cardiovascular: Negative for chest pain.   Gastrointestinal: Positive for diarrhea. Negative for vomiting.   Genitourinary: Negative for dysuria, frequency and urgency.   Musculoskeletal: Negative for back pain, neck pain and neck stiffness.   Skin: Negative for rash and wound.   Neurological: Negative for syncope and weakness.   Hematological: Does not bruise/bleed easily.   Psychiatric/Behavioral: Negative for agitation, behavioral problems and confusion.       Physical Exam     Initial Vitals [04/06/20 2318]   BP Pulse Resp Temp SpO2   131/87 78 19 98.3 °F (36.8 °C) 97 %      MAP       --         Physical Exam    Constitutional: No distress.   HENT:   Head: Normocephalic and atraumatic.   Eyes: Conjunctivae are normal.   Cardiovascular:  Intact distal pulses.   Pulmonary/Chest: No respiratory distress.   Neurological: She is alert.   Skin: Skin is warm and dry.   Psychiatric: She has a normal mood and affect.         ED Course   Procedures  Labs Reviewed   SARS-COV-2 RNA AMPLIFICATION, QUAL - Abnormal; Notable for the following components:       Result Value    SARS-CoV-2 RNA, Amplification, Qual Positive (*)     All other components within normal limits   CBC W/ AUTO DIFFERENTIAL - Abnormal; Notable for the following components:    WBC 3.84 (*)     All other components within normal limits   COMPREHENSIVE METABOLIC PANEL - Abnormal; Notable for the following components:    Potassium 3.2 (*)     Calcium 8.6 (*)     All other components within normal limits   TROPONIN I   NT-PRO NATRIURETIC PEPTIDE          Imaging Results          X-Ray Chest 1 View (Final result)  Result time 04/06/20 23:57:09    Final result by Estrada Bennett MD (04/06/20 23:57:09)                 Impression:      Bilateral lower lobe ground-glass infiltrates characteristic of viral pneumonia.      Electronically signed by: Estrada Bennett MD  Date:    04/06/2020  Time:    23:57             Narrative:    EXAMINATION:  XR CHEST 1 VIEW    CLINICAL HISTORY:  Cough    TECHNIQUE:  AP view of the chest was performed.    COMPARISON:  03/27/2020    FINDINGS:  The cardiac and mediastinal silhouettes appear within normal limits.   Patchy ground-glass opacities in the bilateral lower lobes, left greater than right.  No acute osseous findings demonstrated.                                 Medical Decision Making:   Differential Diagnosis:   Differential Diagnosis includes, but is not limited to:  Sepsis, meningitis, cavernous sinus thrombosis, nasal foreign body, otitis media/external, nasal polyp, bacterial sinusitis, allergic rhinitis, influenza, bacterial/viral pharyngitis, peritonsillar abscess, retropharyngeal abscess, bacterial/viral pneumonia, chf decompensation, ACS, anemia     ED  Management:  Patient was seen in the Emergency Department with symptoms consistent with a viral respiratory illness. Chest xray showed lower lobe opacities bilaterally . Based on the most recent recommendations from our hospital administration/infectious disease department at this time, the patient will be swabbed for COVID 19. Vital signs do not indicate sepsis, hypoxia nor respiratory distress, and in my professional opinion the patient is well enough for discharge home.  The patient was provided with discharge instructions on self-care for  COVID-19 and how to quarantine at home. I reinforced this advice and the dangers to family and public with failure to comply. The patient was also given a return to work note, if applicable. Return precautions discussed with the patient. The patient expressed understanding to my instructions.     After taking into careful account the historical factors and physical exam findings of the patient's presentation today, in conjunction with the empirical and objective data obtained on ED workup, no acute emergent medical condition has been identified. The patient appears to be low risk for an emergent medical condition and I feel it is safe and appropriate at this time for the patient to be discharged to follow-up as detailed in their discharge instructions for reevaluation and possible continued outpatient workup and management. I have discussed the specifics of the workup with the patient and the patient has verbalized understanding of the details of the workup, the diagnosis, the treatment plan, and the need for outpatient follow-up.  Although the patient has no emergent etiology today this does not preclude the development of an emergent condition so in addition, I have advised the patient that they can return to the ED and/or activate EMS at any time with worsening of their symptoms, change of their symptoms, or with any other medical complaint.  The patient remained  comfortable and stable during their visit in the ED.  Discharge and follow-up instructions discussed with the patient who expressed understanding and willingness to comply with my recommendations.                       ED Course as of Apr 07 0022   Mon Apr 06, 2020   2345 Patient without evidence of acute respiratory distress presents with 1 week of upper respiratory symptoms malaise fatigue shortness of breath concerning for COVID infection.  Given history of CHF and obesity has risk factors will obtain labs chest x-ray pulse ox and continue to reassess.    [RN]   2359 Comprehensive metabolic panel(!) [RN]   2359 SARS-CoV-2 RNA, Rapid Amplification, Qual(!) [RN]   Tue Apr 07, 2020   0000 X-Ray Chest 1 View [RN]   0016 EKG:  Rate of 74 normal sinus rhythm no ST segment changes no STEMI no ectopy.    [RN]   0016 CBC auto differential(!) [RN]   0016 Comprehensive metabolic panel(!) [RN]   0017 Troponin I [RN]   0017 NT-Pro Natriuretic Peptide [RN]   0019 Labs reviewed.  Minor leukopenia.  COVID positive.  On repeat assessment while patient ambulates her oxygen sats remain greater than 97%.  She does endorse shortness of breath however currently does not appear in any respiratory distress.  She is not appear volume overloaded I do not suspect CHF for ACS at this time.  I find no indication for admission at this time.  Will discharge with COVID instructions and recommended quarantine.  Also recommended she contact her PCP tomorrow morning.  I recommended she also increase her albuterol every 6 hr as needed for shortness of breath.    [RN]      ED Course User Index  [RN] Chaz Rincon Jr., MD                Clinical Impression:       ICD-10-CM ICD-9-CM   1. COVID-19 virus infection U07.1    2. Cough R05 786.2   3. Dyspnea R06.00 786.09   4. Viral URI with cough J06.9 465.9    B97.89      Portions of this note were dictated using voice recognition software and may contain dictation related errors in  spelling/grammar/syntax not found on text review                             Chaz Rincon Jr., MD  04/07/20 0024

## 2020-04-07 NOTE — TELEPHONE ENCOUNTER
Mary Dueñas Staff   Caller: Self [phone] (Today,  2:49 PM)             Patient would like to speak with you about missing medications                Pt request ear drops rx.

## 2020-04-07 NOTE — PROGRESS NOTES
Subjective:       Patient ID: Anne Rangel is a 35 y.o. female.    The patient location is: home  The chief complaint leading to consultation is: weakness and SOB  Visit type: Virtual visit with synchronous audio and video  Total time spent with patient: 15 min  Each patient to whom he or she provides medical services by telemedicine is:  (1) informed of the relationship between the physician and patient and the respective role of any other health care provider with respect to management of the patient; and (2) notified that he or she may decline to receive medical services by telemedicine and may withdraw from such care at any time.    Notes:     Chest Pain    This is a new (Was seen in the ER yesterday and tested positive for COVID-19.  Troposnin was normal.  EKG was normal.   CXR showed viral pneumonia.  today reports increasing SOB and chest pain and over all weakness. ) problem. The current episode started yesterday. The onset quality is gradual. The problem occurs constantly. The problem has been gradually worsening. The pain is present in the substernal region. The pain is moderate. The quality of the pain is described as heavy. The pain does not radiate. Associated symptoms include a cough, a fever, malaise/fatigue, shortness of breath and sputum production. Pertinent negatives include no abdominal pain, back pain, claudication, diaphoresis, dizziness, exertional chest pressure, headaches, hemoptysis, irregular heartbeat, leg pain, lower extremity edema, nausea, near-syncope, numbness, orthopnea, palpitations, PND, syncope, vomiting or weakness. The cough is productive. The pain is aggravated by coughing. Treatments tried: robitussin, xopenex, The treatment provided mild relief.     Review of Systems   Constitutional: Positive for fever and malaise/fatigue. Negative for diaphoresis and fatigue.   HENT: Negative for congestion, ear pain, postnasal drip, rhinorrhea, sinus pressure, sore throat and voice  change.    Eyes: Negative for discharge.   Respiratory: Positive for cough, sputum production, chest tightness and shortness of breath. Negative for hemoptysis and wheezing.    Cardiovascular: Positive for chest pain. Negative for palpitations, orthopnea, claudication, syncope, PND and near-syncope.   Gastrointestinal: Negative for abdominal pain, diarrhea, nausea and vomiting.   Musculoskeletal: Negative for back pain.   Skin: Negative for rash.   Neurological: Negative for dizziness, weakness, numbness and headaches.         Assessment:       1. COVID-19 virus infection    2. SOB (shortness of breath)        Plan:       COVID-19 virus infection  -     COVID-19 Respiratory Check; Future; Expected date: 04/07/2020    SOB (shortness of breath)  -     COVID-19 Respiratory Check; Future; Expected date: 04/07/2020

## 2020-04-08 ENCOUNTER — TELEPHONE (OUTPATIENT)
Dept: FAMILY MEDICINE | Facility: CLINIC | Age: 35
End: 2020-04-08

## 2020-04-08 ENCOUNTER — PATIENT MESSAGE (OUTPATIENT)
Dept: FAMILY MEDICINE | Facility: CLINIC | Age: 35
End: 2020-04-08

## 2020-04-08 DIAGNOSIS — J06.9 VIRAL URI WITH COUGH: ICD-10-CM

## 2020-04-08 RX ORDER — BENZONATATE 100 MG/1
100 CAPSULE ORAL 3 TIMES DAILY PRN
Qty: 30 CAPSULE | Refills: 0 | Status: SHIPPED | OUTPATIENT
Start: 2020-04-08 | End: 2020-04-18

## 2020-04-08 RX ORDER — GUAIFENESIN/DEXTROMETHORPHAN 100-10MG/5
5 SYRUP ORAL EVERY 6 HOURS PRN
Qty: 120 ML | Refills: 0 | Status: SHIPPED | OUTPATIENT
Start: 2020-04-08 | End: 2020-04-18

## 2020-04-08 NOTE — TELEPHONE ENCOUNTER
Pt is requesting that you send in the following prescriptions:    benzonatate (TESSALON PERLES) 100 MG capsules  And  dextromethorphan-guaifenesin  mg/5 ml (ROBITUSSIN-DM)  mg/5 mL liquid    Meds were not sent to pharmacy. Seems as though scripts were printed on yesterday by ER physician she saw on 4/6/2020.

## 2020-04-08 NOTE — TELEPHONE ENCOUNTER
Spoke with pt and pharmacist (Brissa) at [facility] to confirm that medications have been received. Pt has been notified and verbalized understanding that she can have medications picked up today at pharmacy.

## 2020-04-08 NOTE — TELEPHONE ENCOUNTER
----- Message from Archana Dennis sent at 4/8/2020  1:49 PM CDT -----  Contact: Patient   Patient needing a call back, urgent about miscommunication between pharmacy and doctor.   # [phone]

## 2020-04-08 NOTE — TELEPHONE ENCOUNTER
----- Message from Lola Knight sent at 4/8/2020  1:36 PM CDT -----  Contact: [phone] /self  Patient is requesting the 2 rx be sent to [id number] - [place], [place] - [address] AT [facility] [address] [facility] [address]    benzonatate (TESSALON PERLES) 100 MG capsules  And  dextromethorphan-guaifenesin  mg/5 ml (ROBITUSSIN-DM)  mg/5 mL liquid    The pharmacy did not receive them. Thanks

## 2020-04-08 NOTE — TELEPHONE ENCOUNTER
----- Message from Timoteo Michele sent at 4/8/2020 10:38 AM CDT -----  Contact: [phone] / self   Patient would like to speak with your office regarding 3 prescriptions that were supposed to be sent yesterday. Please Advise.

## 2020-04-08 NOTE — TELEPHONE ENCOUNTER
----- Message from Tomasz Ma sent at 4/8/2020  2:36 PM CDT -----  Contact: pt   PT missed a call and would the nurse to return their call     Pt can be reached at [phone]

## 2020-04-08 NOTE — TELEPHONE ENCOUNTER
----- Message from Tomasz Ma sent at 4/8/2020 10:50 AM CDT -----  Contact: pt   PT missed a call and would the nurse to return their call       Pt can be reached at [phone]

## 2020-04-09 ENCOUNTER — TELEPHONE (OUTPATIENT)
Dept: FAMILY MEDICINE | Facility: CLINIC | Age: 35
End: 2020-04-09

## 2020-04-09 NOTE — TELEPHONE ENCOUNTER
Spoke to pt and Anne at [facility] pharmacy and they will help the pt get the medication from over the counter. The pt understood an will go  her medications.

## 2020-04-09 NOTE — TELEPHONE ENCOUNTER
----- Message from Mariel Munson sent at 4/9/2020 11:42 AM CDT -----  Contact: [phone]/ self   Patient requesting to speak with you regarding the pharmacy saying they do not have the medications that you all sent in. She would like to know if you all can resend it and inform her when you do so. Please advise.

## 2020-04-11 ENCOUNTER — HOSPITAL ENCOUNTER (EMERGENCY)
Facility: HOSPITAL | Age: 35
Discharge: HOME | End: 2020-04-11
Attending: EMERGENCY MEDICINE
Payer: COMMERCIAL

## 2020-04-11 VITALS
TEMPERATURE: 99 F | OXYGEN SATURATION: 96 % | HEART RATE: 90 BPM | BODY MASS INDEX: 27.45 KG/M2 | RESPIRATION RATE: 20 BRPM | SYSTOLIC BLOOD PRESSURE: 109 MMHG | WEIGHT: 145.31 LBS | DIASTOLIC BLOOD PRESSURE: 72 MMHG

## 2020-04-11 DIAGNOSIS — R06.02 SHORTNESS OF BREATH: ICD-10-CM

## 2020-04-11 DIAGNOSIS — Z20.822 SUSPECTED COVID-19 VIRUS INFECTION: ICD-10-CM

## 2020-04-11 DIAGNOSIS — U07.1 COVID-19 VIRUS INFECTION: Primary | ICD-10-CM

## 2020-04-11 DIAGNOSIS — R06.00 DYSPNEA, UNSPECIFIED TYPE: ICD-10-CM

## 2020-04-11 LAB
ALBUMIN SERPL BCP-MCNC: 4.7 G/DL (ref 3.5–5.2)
ALLENS TEST: ABNORMAL
ALP SERPL-CCNC: 62 U/L (ref 38–126)
ALT SERPL W/O P-5'-P-CCNC: 44 U/L (ref 10–44)
ANION GAP SERPL CALC-SCNC: 9 MMOL/L (ref 8–16)
AST SERPL-CCNC: 53 U/L (ref 15–46)
B-HCG UR QL: NEGATIVE
BASOPHILS # BLD AUTO: 0.01 K/UL (ref 0–0.2)
BASOPHILS NFR BLD: 0.2 % (ref 0–1.9)
BILIRUB SERPL-MCNC: 0.8 MG/DL (ref 0.1–1)
BUN SERPL-MCNC: 7 MG/DL (ref 7–17)
CALCIUM SERPL-MCNC: 10 MG/DL (ref 8.7–10.5)
CHLORIDE SERPL-SCNC: 102 MMOL/L (ref 95–110)
CK SERPL-CCNC: 41 U/L (ref 55–170)
CO2 SERPL-SCNC: 27 MMOL/L (ref 23–29)
CREAT SERPL-MCNC: 0.53 MG/DL (ref 0.5–1.4)
CRP SERPL-MCNC: 0.73 MG/DL (ref 0–1)
D DIMER PPP FEU-MCNC: 275 NG/ML
DELSYS: ABNORMAL
DIFFERENTIAL METHOD: NORMAL
EOSINOPHIL # BLD AUTO: 0.1 K/UL (ref 0–0.5)
EOSINOPHIL NFR BLD: 0.9 % (ref 0–8)
ERYTHROCYTE [DISTWIDTH] IN BLOOD BY AUTOMATED COUNT: 13.8 % (ref 11.5–14.5)
EST. GFR  (AFRICAN AMERICAN): >60 ML/MIN/1.73 M^2
EST. GFR  (NON AFRICAN AMERICAN): >60 ML/MIN/1.73 M^2
FERRITIN SERPL-MCNC: 138 NG/ML (ref 20–300)
GLUCOSE SERPL-MCNC: 95 MG/DL (ref 70–110)
HCO3 UR-SCNC: 26.9 MMOL/L (ref 24–28)
HCT VFR BLD AUTO: 42.5 % (ref 37–48.5)
HGB BLD-MCNC: 13.8 G/DL (ref 12–16)
IMM GRANULOCYTES # BLD AUTO: 0.01 K/UL (ref 0–0.04)
IMM GRANULOCYTES NFR BLD AUTO: 0.2 % (ref 0–0.5)
LDH SERPL L TO P-CCNC: 251 U/L (ref 110–260)
LYMPHOCYTES # BLD AUTO: 2.8 K/UL (ref 1–4.8)
LYMPHOCYTES NFR BLD: 42.4 % (ref 18–48)
MCH RBC QN AUTO: 29.6 PG (ref 27–31)
MCHC RBC AUTO-ENTMCNC: 32.5 G/DL (ref 32–36)
MCV RBC AUTO: 91 FL (ref 82–98)
MONOCYTES # BLD AUTO: 0.5 K/UL (ref 0.3–1)
MONOCYTES NFR BLD: 6.9 % (ref 4–15)
NEUTROPHILS # BLD AUTO: 3.3 K/UL (ref 1.8–7.7)
NEUTROPHILS NFR BLD: 49.4 % (ref 38–73)
NRBC BLD-RTO: 0 /100 WBC
PCO2 BLDA: 44.1 MMHG (ref 35–45)
PH SMN: 7.39 [PH] (ref 7.35–7.45)
PLATELET # BLD AUTO: 263 K/UL (ref 150–350)
PMV BLD AUTO: 12.3 FL (ref 9.2–12.9)
PO2 BLDA: 78 MMHG (ref 80–100)
POC BE: 2 MMOL/L
POC SATURATED O2: 95 % (ref 95–100)
POC TCO2: 28 MMOL/L (ref 23–27)
POTASSIUM SERPL-SCNC: 4.2 MMOL/L (ref 3.5–5.1)
PROCALCITONIN SERPL IA-MCNC: 0.05 NG/ML
PROT SERPL-MCNC: 8.5 G/DL (ref 6–8.4)
RBC # BLD AUTO: 4.67 M/UL (ref 4–5.4)
SAMPLE: ABNORMAL
SITE: ABNORMAL
SODIUM SERPL-SCNC: 138 MMOL/L (ref 136–145)
TROPONIN I SERPL DL<=0.01 NG/ML-MCNC: <0.012 NG/ML (ref 0.01–0.03)
WBC # BLD AUTO: 6.65 K/UL (ref 3.9–12.7)

## 2020-04-11 PROCEDURE — 82803 BLOOD GASES ANY COMBINATION: CPT | Mod: ER

## 2020-04-11 PROCEDURE — 82728 ASSAY OF FERRITIN: CPT

## 2020-04-11 PROCEDURE — 83615 LACTATE (LD) (LDH) ENZYME: CPT

## 2020-04-11 PROCEDURE — 85379 FIBRIN DEGRADATION QUANT: CPT | Mod: ER

## 2020-04-11 PROCEDURE — 84145 PROCALCITONIN (PCT): CPT | Mod: ER

## 2020-04-11 PROCEDURE — 84484 ASSAY OF TROPONIN QUANT: CPT | Mod: ER

## 2020-04-11 PROCEDURE — 93010 EKG 12-LEAD: ICD-10-PCS | Mod: ,,, | Performed by: INTERNAL MEDICINE

## 2020-04-11 PROCEDURE — 96375 TX/PRO/DX INJ NEW DRUG ADDON: CPT | Mod: ER

## 2020-04-11 PROCEDURE — 80053 COMPREHEN METABOLIC PANEL: CPT | Mod: ER

## 2020-04-11 PROCEDURE — 99285 EMERGENCY DEPT VISIT HI MDM: CPT | Mod: 25,ER

## 2020-04-11 PROCEDURE — 99900035 HC TECH TIME PER 15 MIN (STAT): Mod: ER

## 2020-04-11 PROCEDURE — 96374 THER/PROPH/DIAG INJ IV PUSH: CPT | Mod: ER

## 2020-04-11 PROCEDURE — 63600175 PHARM REV CODE 636 W HCPCS: Mod: ER | Performed by: EMERGENCY MEDICINE

## 2020-04-11 PROCEDURE — 86140 C-REACTIVE PROTEIN: CPT | Mod: ER

## 2020-04-11 PROCEDURE — 82550 ASSAY OF CK (CPK): CPT | Mod: ER

## 2020-04-11 PROCEDURE — 93005 ELECTROCARDIOGRAM TRACING: CPT | Mod: ER

## 2020-04-11 PROCEDURE — 81025 URINE PREGNANCY TEST: CPT | Mod: ER

## 2020-04-11 PROCEDURE — 87040 BLOOD CULTURE FOR BACTERIA: CPT | Mod: 59,ER

## 2020-04-11 PROCEDURE — 36600 WITHDRAWAL OF ARTERIAL BLOOD: CPT | Mod: ER

## 2020-04-11 PROCEDURE — 93010 ELECTROCARDIOGRAM REPORT: CPT | Mod: ,,, | Performed by: INTERNAL MEDICINE

## 2020-04-11 PROCEDURE — 85025 COMPLETE CBC W/AUTO DIFF WBC: CPT | Mod: ER

## 2020-04-11 RX ORDER — METOCLOPRAMIDE HYDROCHLORIDE 5 MG/ML
10 INJECTION INTRAMUSCULAR; INTRAVENOUS
Status: COMPLETED | OUTPATIENT
Start: 2020-04-11 | End: 2020-04-11

## 2020-04-11 RX ORDER — METOCLOPRAMIDE 10 MG/1
10 TABLET ORAL EVERY 6 HOURS PRN
Qty: 14 TABLET | Refills: 0 | Status: SHIPPED | OUTPATIENT
Start: 2020-04-11 | End: 2020-10-30 | Stop reason: ALTCHOICE

## 2020-04-11 RX ORDER — METOCLOPRAMIDE 10 MG/1
10 TABLET ORAL EVERY 6 HOURS PRN
Qty: 14 TABLET | Refills: 0 | Status: SHIPPED | OUTPATIENT
Start: 2020-04-11 | End: 2020-04-11 | Stop reason: SDUPTHER

## 2020-04-11 RX ORDER — FUROSEMIDE 10 MG/ML
80 INJECTION INTRAMUSCULAR; INTRAVENOUS
Status: COMPLETED | OUTPATIENT
Start: 2020-04-11 | End: 2020-04-11

## 2020-04-11 RX ADMIN — FUROSEMIDE 80 MG: 10 INJECTION, SOLUTION INTRAVENOUS at 02:04

## 2020-04-11 RX ADMIN — METOCLOPRAMIDE 10 MG: 5 INJECTION, SOLUTION INTRAMUSCULAR; INTRAVENOUS at 12:04

## 2020-04-11 NOTE — ED PROVIDER NOTES
Encounter Date: 4/11/2020       History     Chief Complaint   Patient presents with    Shortness of Breath     States she has been experiencing SOB x2 days. States SOB made worse upon laying down.     Weakness    Dizziness    Headache    Cough     Dry, hacking cough    Chest Pain     35-year-old female presents to the emergency department about 5 days after her diagnosis with corona virus complaining of persistent cough, shortness of breath, generalized weakness.  States her cough is minimally productive but persistent.  No relief with prescribed medications.  States she feels very short of breath when she lies supine, which has been prevented her from sleeping at night.  Also reports shortness of breath with exertion.  Notes nausea without emesis.  States that her whole body aches.  Also notes intermittent striking chest pain without eliciting, exacerbating, or alleviating factors.  Denies any recent fever.  Notes some nasal congestion and rhinorrhea.  Denies sore throat.  Denies any vomiting or diarrhea.        Review of patient's allergies indicates:  No Known Allergies  Past Medical History:   Diagnosis Date    CHF (congestive heart failure)     Hypertension     Stroke      History reviewed. No pertinent surgical history.  History reviewed. No pertinent family history.  Social History     Tobacco Use    Smoking status: Never Smoker    Smokeless tobacco: Never Used   Substance Use Topics    Alcohol use: Yes     Comment: occasionally    Drug use: No     Review of Systems   Constitutional: Positive for fatigue. Negative for chills and fever.   HENT: Positive for congestion. Negative for sore throat and voice change.    Respiratory: Positive for cough and shortness of breath.    Cardiovascular: Positive for chest pain. Negative for palpitations and leg swelling.   Gastrointestinal: Positive for nausea. Negative for abdominal pain, diarrhea and vomiting.   Genitourinary: Negative for dysuria, frequency  and urgency.   Musculoskeletal: Negative for back pain, neck pain and neck stiffness.   Neurological: Negative for light-headedness, numbness and headaches.       Physical Exam     Initial Vitals [04/11/20 1203]   BP Pulse Resp Temp SpO2   111/75 86 (!) 22 98.7 °F (37.1 °C) 100 %      MAP       --         Physical Exam    Nursing note and vitals reviewed.  Constitutional: She appears well-developed and well-nourished.   HENT:   Head: Normocephalic and atraumatic.   Eyes: Conjunctivae and EOM are normal. Pupils are equal, round, and reactive to light.   Neck: Normal range of motion. Neck supple. No tracheal deviation present.   Cardiovascular: Normal rate and intact distal pulses.   Pulmonary/Chest: She is in respiratory distress (Mild increased WOBing, tachypneic).   Abdominal: Soft. She exhibits no distension.   Musculoskeletal: Normal range of motion. She exhibits no edema or tenderness.   Neurological: She is alert and oriented to person, place, and time. She has normal strength. No cranial nerve deficit.   Skin: Skin is warm and dry.         ED Course   Procedures  Labs Reviewed   CULTURE, BLOOD   CULTURE, BLOOD   PREGNANCY TEST, URINE RAPID   CBC W/ AUTO DIFFERENTIAL   COMPREHENSIVE METABOLIC PANEL   C-REACTIVE PROTEIN   FERRITIN   LACTATE DEHYDROGENASE   CK   TROPONIN I   NT-PRO NATRIURETIC PEPTIDE   PROCALCITONIN     EKG Readings: (Independently Interpreted)   Initial Reading: No STEMI. Previous EKG: Compared with most recent EKG Previous EKG Date: 4/7/2020 (Minimal change). Rhythm: Normal Sinus Rhythm. Heart Rate: 83. Ectopy: No Ectopy. Conduction: Normal. ST Segments: Normal ST Segments. T Waves: Normal. Axis: Normal.         X-Rays:   Independently Interpreted Readings:   Other Readings:  Imaging interpreted by radiologist and visualized by me:    Imaging Results          X-Ray Chest AP Portable (Final result)  Result time 04/11/20 13:36:24    Final result by Larry Callahan Jr., MD (04/11/20 13:36:24)                  Impression:      Persistent bibasilar ground-glass opacity which could relate to atypical infection.  This is similar to the prior radiograph      Electronically signed by: Larry Callahan Jr., MD  Date:    04/11/2020  Time:    13:36             Narrative:    EXAMINATION:  XR CHEST AP PORTABLE    CLINICAL HISTORY:  Shortness of breath  COVID-19    COMPARISON:  Prior radiograph from April 6, 2020.    FINDINGS:  There unchanged ground-glass opacities within the peripheries of both lower lobes, left more extensive than right.  No pleural fluid or pneumothorax.  Heart size within normal limits.  No significant bony findings.                                Medical Decision Making:   Initial Assessment:   35-year-old female presents emergency department complaining cough, shortness of breath  Differential Diagnosis:   Dehydration, pulmonary embolism, arrhythmia, corona virus  Independently Interpreted Test(s):   I have ordered and independently interpreted X-rays - see prior notes.  I have ordered and independently interpreted EKG Reading(s) - see prior notes  Clinical Tests:   Lab Tests: Reviewed       <> Summary of Lab: Benign  ED Management:  Patient's age adjusted D-dimer is negative.  Her ABG is consistent with borderline hypoxemia at rest.  Her vital signs and results are otherwise stable.  She appears much more comfortable on oxygen.  Have arranged for home oxygen use and explained plan to discharge with prescription for Reglan, instructions on home management, strict return precautions given.                                 Clinical Impression:       ICD-10-CM ICD-9-CM   1. COVID-19 virus infection U07.1    2. Suspected Covid-19 Virus Infection R68.89    3. Shortness of breath R06.02 786.05   4. Dyspnea, unspecified type R06.00 786.09         Disposition:   Disposition: Discharged  Condition: Stable                        Parmjit Albarran MD  04/11/20 9151

## 2020-04-11 NOTE — ED NOTES
Called on-call DME representative to f/u regarding home oxygen delivery; informed that he did not receive the faxed information on the patient; face sheet and order refaxed

## 2020-04-11 NOTE — ED NOTES
"Spoke to on-call DME rep; face sheet and order faxed to [phone]; informed that "we're back logged, so it's going to be a while before we can get there"  "

## 2020-04-11 NOTE — DISCHARGE INSTRUCTIONS
After dinner Tylenol 650 mg every 8 hr as needed for pain.  You may also want to consider over-the-counter medications such as Nasonex and/or Mucinex.  Please self quarantine for 2 weeks.  I recommend checking the website ready.jennifer.gov for quarantine recommendations and other resources for coronavirus. Please return to the emergency room with any worsening symptoms, particularly shortness of breath.

## 2020-04-11 NOTE — ED NOTES
Results for WILLY MAS (MRN [id number]) as of 4/11/2020 12:52   Ref. Range 4/11/2020 12:43   POC PH Latest Ref Range: 7.35 - 7.45  7.393   POC PCO2 Latest Ref Range: 35 - 45 mmHg 44.1   POC PO2 Latest Ref Range: 80 - 100 mmHg 78 (L)   POC BE Latest Ref Range: -2 to 2 mmol/L 2   POC HCO3 Latest Ref Range: 24 - 28 mmol/L 26.9   POC SATURATED O2 Latest Ref Range: 95 - 100 % 95   POC TCO2 Latest Ref Range: 23 - 27 mmol/L 28 (H)   Sample Unknown ARTERIAL   DelSys Unknown Room Air   Allens Test Unknown Pass   Site Unknown LR   Results reported to Dr. Parmjit Albarran in physician room on 4/11/20 @ 1253 by FrancineCRT

## 2020-04-15 ENCOUNTER — TELEPHONE (OUTPATIENT)
Dept: FAMILY MEDICINE | Facility: CLINIC | Age: 35
End: 2020-04-15

## 2020-04-15 LAB
BACTERIA BLD CULT: NORMAL
BACTERIA BLD CULT: NORMAL

## 2020-04-15 NOTE — TELEPHONE ENCOUNTER
----- Message from Timoteo Michele sent at 4/15/2020 12:26 PM CDT -----  Contact: [phone] / self   Patient would like to speak with your office regarding testing for covid-19. Please Advise.

## 2020-04-15 NOTE — TELEPHONE ENCOUNTER
Spoke with Anne.  She wants to know when she can return to work. I explained the following guidelines.      At least 3 days (72 hours) have passed since recovery defined as resolution of fever without the use of fever-reducing medications and improvement in respiratory symptoms (e.g., cough, shortness of breath); and,  At least 7 days have passed since symptoms first appeared

## 2020-04-22 ENCOUNTER — TELEPHONE (OUTPATIENT)
Dept: FAMILY MEDICINE | Facility: CLINIC | Age: 35
End: 2020-04-22

## 2020-04-22 DIAGNOSIS — N64.4 MASTALGIA: Primary | ICD-10-CM

## 2020-04-22 DIAGNOSIS — N63.10 BREAST MASS, RIGHT: ICD-10-CM

## 2020-04-22 NOTE — TELEPHONE ENCOUNTER
Called and spoke with patient.  She notes that she has been having pain in her right breast for over 1 month.  As of 1 week ago she noticed a tender lump.  She has some pain in her left breast as well, but no lumps.  No family history of breast cancer.  No fevers.    I will order a diagnostic mammogram.     She has recovered from COVID and meets the  Recommendations for return to work.

## 2020-04-22 NOTE — TELEPHONE ENCOUNTER
----- Message from Mariel Munson sent at 4/22/2020  4:13 PM CDT -----  Contact: [phone]/ self   Patient requesting to speak with you regarding aching breast and her right breast also has a knot in it. Please call.       Please advise - she is positive for covid   Office visit?

## 2020-04-23 ENCOUNTER — TELEPHONE (OUTPATIENT)
Dept: FAMILY MEDICINE | Facility: CLINIC | Age: 35
End: 2020-04-23

## 2020-04-23 ENCOUNTER — PATIENT MESSAGE (OUTPATIENT)
Dept: FAMILY MEDICINE | Facility: CLINIC | Age: 35
End: 2020-04-23

## 2020-04-23 DIAGNOSIS — N63.10 BREAST MASS, RIGHT: ICD-10-CM

## 2020-04-23 DIAGNOSIS — N64.4 MASTALGIA: Primary | ICD-10-CM

## 2020-04-23 DIAGNOSIS — N63.10 BREAST MASS, RIGHT: Primary | ICD-10-CM

## 2020-04-23 NOTE — TELEPHONE ENCOUNTER
----- Message from Jacquelin Gillespie sent at 4/23/2020  8:33 AM CDT -----  Dr. Dueñas,    I just spoke with [facility] they can do a STAT, but it has to have an ultrasound to go with it.       Thank you,    Jacquelin    Referral Coordinator

## 2020-04-23 NOTE — TELEPHONE ENCOUNTER
Spoke to pt and she states she went to [facility] to have another covid test and it came back positive. Pt has questions that she would like you to answer.

## 2020-04-23 NOTE — TELEPHONE ENCOUNTER
Spoke with patient.  Test is still positive for coronavirus.  I recommend she stay home another week and self isolate.  May recheck in 1-2 weeks.

## 2020-04-23 NOTE — TELEPHONE ENCOUNTER
----- Message from Lola Knight sent at 4/23/2020  9:54 AM CDT -----  Contact: [phone]/self  Patient is requesting a call back regarding Covid 19. Thanks

## 2020-04-28 ENCOUNTER — TELEPHONE (OUTPATIENT)
Dept: FAMILY MEDICINE | Facility: CLINIC | Age: 35
End: 2020-04-28

## 2020-04-28 ENCOUNTER — PATIENT MESSAGE (OUTPATIENT)
Dept: FAMILY MEDICINE | Facility: CLINIC | Age: 35
End: 2020-04-28

## 2020-04-28 DIAGNOSIS — E66.9 OBESITY (BMI 30-39.9): ICD-10-CM

## 2020-04-28 RX ORDER — TOPIRAMATE 25 MG/1
25 TABLET ORAL 2 TIMES DAILY
Qty: 60 TABLET | Refills: 11 | Status: SHIPPED | OUTPATIENT
Start: 2020-04-28 | End: 2020-07-06 | Stop reason: SDUPTHER

## 2020-04-28 NOTE — TELEPHONE ENCOUNTER
----- Message from oLla Knight sent at 4/28/2020 10:57 AM CDT -----  Contact: [phone]/self  Patient is requesting a call back regarding getting a letter stating she still has the virus and will not return until all of her symptoms are gone. Please call her to discuss. She is still testing positive for Covid 19. Thanks

## 2020-05-05 ENCOUNTER — TELEPHONE (OUTPATIENT)
Dept: FAMILY MEDICINE | Facility: CLINIC | Age: 35
End: 2020-05-05

## 2020-05-05 NOTE — TELEPHONE ENCOUNTER
"LM advising " Julia" to contact office with any questions. Patient's telephone number has been provided.      ----- Message from Timoteo Michele sent at 5/5/2020 10:41 AM CDT -----  Contact: julia CALLEJAS / [phone]   Julia would like to speak with your office regarding paperwork that was faxed over for the patient , pt's phone number is missing off the form. Please Advise.     "

## 2020-05-12 ENCOUNTER — TELEPHONE (OUTPATIENT)
Dept: FAMILY MEDICINE | Facility: CLINIC | Age: 35
End: 2020-05-12

## 2020-05-13 ENCOUNTER — PATIENT MESSAGE (OUTPATIENT)
Dept: FAMILY MEDICINE | Facility: CLINIC | Age: 35
End: 2020-05-13

## 2020-05-14 DIAGNOSIS — R05.9 COUGH: Primary | ICD-10-CM

## 2020-06-03 ENCOUNTER — OFFICE VISIT (OUTPATIENT)
Dept: FAMILY MEDICINE | Facility: CLINIC | Age: 35
End: 2020-06-03
Payer: COMMERCIAL

## 2020-06-03 VITALS
HEIGHT: 61 IN | OXYGEN SATURATION: 99 % | HEART RATE: 82 BPM | WEIGHT: 150.81 LBS | SYSTOLIC BLOOD PRESSURE: 102 MMHG | DIASTOLIC BLOOD PRESSURE: 74 MMHG | TEMPERATURE: 98 F | BODY MASS INDEX: 28.47 KG/M2

## 2020-06-03 DIAGNOSIS — H65.06 RECURRENT ACUTE SEROUS OTITIS MEDIA OF BOTH EARS: ICD-10-CM

## 2020-06-03 DIAGNOSIS — H92.01 OTALGIA OF RIGHT EAR: Primary | ICD-10-CM

## 2020-06-03 PROCEDURE — 99213 OFFICE O/P EST LOW 20 MIN: CPT | Mod: S$GLB,,, | Performed by: FAMILY MEDICINE

## 2020-06-03 PROCEDURE — 3008F BODY MASS INDEX DOCD: CPT | Mod: CPTII,S$GLB,, | Performed by: FAMILY MEDICINE

## 2020-06-03 PROCEDURE — 3074F PR MOST RECENT SYSTOLIC BLOOD PRESSURE < 130 MM HG: ICD-10-PCS | Mod: CPTII,S$GLB,, | Performed by: FAMILY MEDICINE

## 2020-06-03 PROCEDURE — 99213 PR OFFICE/OUTPT VISIT, EST, LEVL III, 20-29 MIN: ICD-10-PCS | Mod: S$GLB,,, | Performed by: FAMILY MEDICINE

## 2020-06-03 PROCEDURE — 3078F PR MOST RECENT DIASTOLIC BLOOD PRESSURE < 80 MM HG: ICD-10-PCS | Mod: CPTII,S$GLB,, | Performed by: FAMILY MEDICINE

## 2020-06-03 PROCEDURE — 3074F SYST BP LT 130 MM HG: CPT | Mod: CPTII,S$GLB,, | Performed by: FAMILY MEDICINE

## 2020-06-03 PROCEDURE — 3008F PR BODY MASS INDEX (BMI) DOCUMENTED: ICD-10-PCS | Mod: CPTII,S$GLB,, | Performed by: FAMILY MEDICINE

## 2020-06-03 PROCEDURE — 3078F DIAST BP <80 MM HG: CPT | Mod: CPTII,S$GLB,, | Performed by: FAMILY MEDICINE

## 2020-06-03 RX ORDER — FLUTICASONE PROPIONATE 50 MCG
1 SPRAY, SUSPENSION (ML) NASAL 2 TIMES DAILY PRN
Qty: 15 G | Refills: 3 | Status: SHIPPED | OUTPATIENT
Start: 2020-06-03 | End: 2020-06-03 | Stop reason: SDUPTHER

## 2020-06-03 RX ORDER — FLUTICASONE PROPIONATE 50 MCG
1 SPRAY, SUSPENSION (ML) NASAL 2 TIMES DAILY PRN
Qty: 15 G | Refills: 3 | Status: SHIPPED | OUTPATIENT
Start: 2020-06-03 | End: 2020-10-30 | Stop reason: ALTCHOICE

## 2020-06-03 NOTE — LETTER
Lisa 3, 2020      [address] [address]  Phone: [phone]  Fax: [phone]       Patient: Anne Rangel   YOB: 1985  Date of Visit: 06/03/2020    To Whom It May Concern:    Loki Rangel  was at [facility] on 06/03/2020. She may return to work/school on 06/08/2020 with no restrictions. If you have any questions or concerns, or if I can be of further assistance, please do not hesitate to contact me.    Sincerely,    Leana Feliz, DO

## 2020-06-03 NOTE — PROGRESS NOTES
Subjective:       Patient ID: Anne Rangel is a 35 y.o. female.    Chief Complaint: Otalgia    Otalgia    There is pain in both ears. This is a recurrent problem. The current episode started in the past 7 days. The problem occurs constantly. The problem has been unchanged. There has been no fever. Associated symptoms include coughing and rhinorrhea. Pertinent negatives include no abdominal pain, diarrhea, ear discharge, headaches, hearing loss, neck pain, rash, sore throat or vomiting. She has tried nothing for the symptoms. Allergies     Review of Systems   Constitutional: Negative for fatigue and fever.   HENT: Positive for ear pain and rhinorrhea. Negative for congestion, ear discharge, hearing loss, postnasal drip, sinus pressure, sore throat and voice change.    Eyes: Negative for discharge.   Respiratory: Positive for cough. Negative for chest tightness, shortness of breath and wheezing.    Cardiovascular: Negative for chest pain.   Gastrointestinal: Negative for abdominal pain, diarrhea and vomiting.   Musculoskeletal: Negative for neck pain.   Skin: Negative for rash.   Neurological: Negative for headaches.       Past Medical History:   Diagnosis Date    Asthma     CHF (congestive heart failure)     Hypertension     Stroke      Social History     Socioeconomic History    Marital status: Single     Spouse name: Not on file    Number of children: Not on file    Years of education: Not on file    Highest education level: Not on file   Occupational History    Not on file   Social Needs    Financial resource strain: Not on file    Food insecurity:     Worry: Not on file     Inability: Not on file    Transportation needs:     Medical: Not on file     Non-medical: Not on file   Tobacco Use    Smoking status: Never Smoker    Smokeless tobacco: Never Used   Substance and Sexual Activity    Alcohol use: Yes     Comment: occasionally    Drug use: No    Sexual activity: Not on file   Lifestyle     "Physical activity:     Days per week: Not on file     Minutes per session: Not on file    Stress: Not on file   Relationships    Social connections:     Talks on phone: Not on file     Gets together: Not on file     Attends [employer] service: Not on file     Active member of club or organization: Not on file     Attends meetings of clubs or organizations: Not on file     Relationship status: Not on file   Other Topics Concern    Not on file   Social History Narrative    Not on file       Objective:     /74   Pulse 82   Temp 97.7 °F (36.5 °C)   Ht 5' 1" (1.549 m)   Wt 68.4 kg (150 lb 12.7 oz)   SpO2 99%   BMI 28.49 kg/m²     Physical Exam   Constitutional: She appears well-developed. No distress.   HENT:   Head: Normocephalic.   Right Ear: External ear and ear canal normal. A middle ear effusion is present.   Left Ear: External ear and ear canal normal. A middle ear effusion is present.   Nose: Rhinorrhea present.   Mouth/Throat: Posterior oropharyngeal erythema present.   Cardiovascular: Normal rate and regular rhythm.   Pulmonary/Chest: Effort normal and breath sounds normal. She has no wheezes.   Lymphadenopathy:     She has no cervical adenopathy.   Skin: Skin is warm and dry. No rash noted.       Assessment:       1. Otalgia of right ear    2. Recurrent acute serous otitis media of both ears        Plan:       Otalgia of right ear    Recurrent acute serous otitis media of both ears  -     loratadine-pseudoephedrine 5-120 mg (CLARITIN-D 12 HOUR) 5-120 mg per tablet; Take 1 tablet by mouth 2 (two) times daily. for 10 days  Dispense: 20 tablet; Refill: 0  -     Discontinue: fluticasone propionate (FLONASE) 50 mcg/actuation nasal spray; 1 spray (50 mcg total) by Each Nostril route 2 (two) times daily as needed for Rhinitis.  Dispense: 15 g; Refill: 3  -     fluticasone propionate (FLONASE) 50 mcg/actuation nasal spray; 1 spray (50 mcg total) by Each Nostril route 2 (two) times daily as needed for " Rhinitis.  Dispense: 15 g; Refill: 3

## 2020-07-06 DIAGNOSIS — E66.9 OBESITY (BMI 30-39.9): ICD-10-CM

## 2020-07-07 RX ORDER — SPIRONOLACTONE 50 MG/1
50 TABLET, FILM COATED ORAL DAILY
Qty: 90 TABLET | Refills: 3 | Status: SHIPPED | OUTPATIENT
Start: 2020-07-07 | End: 2020-07-07 | Stop reason: SDUPTHER

## 2020-07-07 RX ORDER — SPIRONOLACTONE 50 MG/1
50 TABLET, FILM COATED ORAL DAILY
Qty: 90 TABLET | Refills: 3 | Status: SHIPPED | OUTPATIENT
Start: 2020-07-07 | End: 2020-10-30 | Stop reason: SDUPTHER

## 2020-07-07 RX ORDER — TOPIRAMATE 25 MG/1
25 TABLET ORAL 2 TIMES DAILY
Qty: 60 TABLET | Refills: 11 | Status: SHIPPED | OUTPATIENT
Start: 2020-07-07 | End: 2020-10-30 | Stop reason: ALTCHOICE

## 2020-07-07 RX ORDER — FUROSEMIDE 80 MG/1
80 TABLET ORAL DAILY
Qty: 90 TABLET | Refills: 3 | Status: SHIPPED | OUTPATIENT
Start: 2020-07-07 | End: 2020-10-30 | Stop reason: SDUPTHER

## 2020-07-07 RX ORDER — FUROSEMIDE 80 MG/1
80 TABLET ORAL DAILY
Qty: 90 TABLET | Refills: 3 | Status: SHIPPED | OUTPATIENT
Start: 2020-07-07 | End: 2020-07-07 | Stop reason: SDUPTHER

## 2020-07-22 ENCOUNTER — HOSPITAL ENCOUNTER (EMERGENCY)
Facility: HOSPITAL | Age: 35
Discharge: HOME | End: 2020-07-22
Attending: EMERGENCY MEDICINE
Payer: MEDICAID

## 2020-07-22 VITALS
DIASTOLIC BLOOD PRESSURE: 69 MMHG | WEIGHT: 145 LBS | HEIGHT: 61 IN | HEART RATE: 64 BPM | OXYGEN SATURATION: 100 % | SYSTOLIC BLOOD PRESSURE: 104 MMHG | BODY MASS INDEX: 27.38 KG/M2 | TEMPERATURE: 98 F | RESPIRATION RATE: 20 BRPM

## 2020-07-22 DIAGNOSIS — D21.9 FIBROID: Primary | ICD-10-CM

## 2020-07-22 DIAGNOSIS — M54.50 ACUTE LEFT-SIDED LOW BACK PAIN WITHOUT SCIATICA: ICD-10-CM

## 2020-07-22 LAB
B-HCG UR QL: NEGATIVE
BASOPHILS # BLD AUTO: 0.03 K/UL (ref 0–0.2)
BASOPHILS NFR BLD: 0.5 % (ref 0–1.9)
BILIRUB UR QL STRIP: NEGATIVE
BUN BLD-MCNC: 10 MG/DL (ref 6–20)
CA-I BLDV-SCNC: 1.18 MMOL/L (ref 1.06–1.42)
CHLORIDE BLD-SCNC: 101 MMOL/L (ref 95–110)
CLARITY UR REFRACT.AUTO: ABNORMAL
CO2 BLD-SCNC: 26 MMOL/L (ref 23–29)
COLOR UR AUTO: YELLOW
CREATININE: 0.8 MG/DL (ref 0.5–1.4)
DIFFERENTIAL METHOD: NORMAL
EOSINOPHIL # BLD AUTO: 0 K/UL (ref 0–0.5)
EOSINOPHIL NFR BLD: 0.3 % (ref 0–8)
ERYTHROCYTE [DISTWIDTH] IN BLOOD BY AUTOMATED COUNT: 13 % (ref 11.5–14.5)
GLUCOSE BLD-MCNC: 90 MG/DL (ref 70–110)
GLUCOSE UR QL STRIP: NEGATIVE
HCT VFR BLD AUTO: 39.6 % (ref 37–48.5)
HGB BLD-MCNC: 12.9 G/DL (ref 12–16)
HGB UR QL STRIP: NEGATIVE
IMM GRANULOCYTES # BLD AUTO: 0.01 K/UL (ref 0–0.04)
IMM GRANULOCYTES NFR BLD AUTO: 0.2 % (ref 0–0.5)
KETONES UR QL STRIP: NEGATIVE
LEUKOCYTE ESTERASE UR QL STRIP: NEGATIVE
LYMPHOCYTES # BLD AUTO: 2.7 K/UL (ref 1–4.8)
LYMPHOCYTES NFR BLD: 46.1 % (ref 18–48)
MCH RBC QN AUTO: 30 PG (ref 27–31)
MCHC RBC AUTO-ENTMCNC: 32.6 G/DL (ref 32–36)
MCV RBC AUTO: 92 FL (ref 82–98)
MONOCYTES # BLD AUTO: 0.3 K/UL (ref 0.3–1)
MONOCYTES NFR BLD: 5.9 % (ref 4–15)
NEUTROPHILS # BLD AUTO: 2.7 K/UL (ref 1.8–7.7)
NEUTROPHILS NFR BLD: 47 % (ref 38–73)
NITRITE UR QL STRIP: NEGATIVE
NRBC BLD-RTO: 0 /100 WBC
PH UR STRIP: 7 [PH] (ref 5–8)
PLATELET # BLD AUTO: 286 K/UL (ref 150–350)
PMV BLD AUTO: 12 FL (ref 9.2–12.9)
POTASSIUM BLD-SCNC: 3.1 MMOL/L (ref 3.5–5.1)
PROT UR QL STRIP: NEGATIVE
RBC # BLD AUTO: 4.3 M/UL (ref 4–5.4)
SODIUM BLD-SCNC: 140 MMOL/L (ref 136–145)
SP GR UR STRIP: 1.01 (ref 1–1.03)
URN SPEC COLLECT METH UR: ABNORMAL
UROBILINOGEN UR STRIP-ACNC: 1 EU/DL
WBC # BLD AUTO: 5.79 K/UL (ref 3.9–12.7)

## 2020-07-22 PROCEDURE — 80047 BASIC METABLC PNL IONIZED CA: CPT | Mod: ER

## 2020-07-22 PROCEDURE — 81003 URINALYSIS AUTO W/O SCOPE: CPT | Mod: ER

## 2020-07-22 PROCEDURE — 85025 COMPLETE CBC W/AUTO DIFF WBC: CPT | Mod: ER

## 2020-07-22 PROCEDURE — 99285 EMERGENCY DEPT VISIT HI MDM: CPT | Mod: 25,ER

## 2020-07-22 PROCEDURE — 25000003 PHARM REV CODE 250: Mod: ER | Performed by: EMERGENCY MEDICINE

## 2020-07-22 PROCEDURE — 81025 URINE PREGNANCY TEST: CPT | Mod: ER

## 2020-07-22 RX ORDER — HYDROCODONE BITARTRATE AND ACETAMINOPHEN 5; 325 MG/1; MG/1
1 TABLET ORAL EVERY 4 HOURS PRN
Qty: 10 TABLET | Refills: 0 | Status: SHIPPED | OUTPATIENT
Start: 2020-07-22 | End: 2020-08-01

## 2020-07-22 RX ORDER — CYCLOBENZAPRINE HCL 10 MG
10 TABLET ORAL 3 TIMES DAILY PRN
Qty: 15 TABLET | Refills: 0 | Status: SHIPPED | OUTPATIENT
Start: 2020-07-22 | End: 2020-07-27

## 2020-07-22 RX ORDER — OXYCODONE AND ACETAMINOPHEN 5; 325 MG/1; MG/1
2 TABLET ORAL
Status: COMPLETED | OUTPATIENT
Start: 2020-07-22 | End: 2020-07-22

## 2020-07-22 RX ORDER — IBUPROFEN 400 MG/1
800 TABLET ORAL
Status: COMPLETED | OUTPATIENT
Start: 2020-07-22 | End: 2020-07-22

## 2020-07-22 RX ORDER — NAPROXEN 375 MG/1
375 TABLET ORAL 2 TIMES DAILY WITH MEALS
Qty: 30 TABLET | Refills: 0 | Status: SHIPPED | OUTPATIENT
Start: 2020-07-22 | End: 2022-01-27

## 2020-07-22 RX ADMIN — OXYCODONE AND ACETAMINOPHEN 2 TABLET: 5; 325 TABLET ORAL at 07:07

## 2020-07-22 RX ADMIN — IBUPROFEN 800 MG: 400 TABLET, FILM COATED ORAL at 07:07

## 2020-07-22 NOTE — ED PROVIDER NOTES
Encounter Date: 7/22/2020       History     Chief Complaint   Patient presents with    Flank Pain     c/o left lower back pain started 2 days ago. denies any injury states woke up with pain. denies any urinary s/s.     35-year-old female with history asthma, CHF, hypertension, asthma and stroke presenting with 2 days of left flank pain.  Denies fever, dysuria, vomiting, diarrhea.  Patient initially thought that maybe her symptoms are secondary to a new mattress but her pain did not feel like a neck strain she recently had so she did not think that it was a muscle strain.  No history of kidney stones.          Review of patient's allergies indicates:  No Known Allergies  Past Medical History:   Diagnosis Date    Asthma     CHF (congestive heart failure)     Hypertension     Stroke      Past Surgical History:   Procedure Laterality Date    MYOMECTOMY      TUBAL LIGATION       History reviewed. No pertinent family history.  Social History     Tobacco Use    Smoking status: Never Smoker    Smokeless tobacco: Never Used   Substance Use Topics    Alcohol use: Yes     Comment: occasionally    Drug use: No     Review of Systems   Constitutional: Negative for fever.   Eyes: Negative for pain.   Respiratory: Negative for shortness of breath.    Cardiovascular: Negative for chest pain.   Gastrointestinal: Negative for abdominal pain, nausea and vomiting.   Genitourinary: Positive for flank pain. Negative for difficulty urinating.   Musculoskeletal: Negative for back pain and neck pain.   Neurological: Negative for headaches.   Psychiatric/Behavioral: Negative for confusion.       Physical Exam     Initial Vitals [07/22/20 1610]   BP Pulse Resp Temp SpO2   123/74 77 19 98.2 °F (36.8 °C) 99 %      MAP       --         Physical Exam    Nursing note and vitals reviewed.  HENT:   Head: Atraumatic.   Eyes: Conjunctivae and EOM are normal.   Neck: Normal range of motion.   Cardiovascular: Exam reveals no gallop and no  friction rub.    No murmur heard.  Pulmonary/Chest: Breath sounds normal. No respiratory distress.   Abdominal: Soft. She exhibits no distension. There is abdominal tenderness (Mild left lower quadrant tenderness). There is no rebound.   Musculoskeletal: Normal range of motion. No edema.      Comments: Pain elicited in the left low back with bending or twisting of the torso   Neurological: She is alert and oriented to person, place, and time.   Psychiatric: She has a normal mood and affect.         ED Course   Procedures  Labs Reviewed   URINALYSIS, REFLEX TO URINE CULTURE - Abnormal; Notable for the following components:       Result Value    Appearance, UA Hazy (*)     All other components within normal limits    Narrative:     Specimen Source->Urine   BASIC METABOLIC PANEL, POCT - Abnormal; Notable for the following components:    Potassium 3.1 (*)     All other components within normal limits   CBC W/ AUTO DIFFERENTIAL   PREGNANCY TEST, URINE RAPID    Narrative:     Specimen Source->Urine          Imaging Results          US Pelvis Complete Non OB (Final result)  Result time 07/22/20 19:01:59    Final result by Antony Prasad MD (07/22/20 19:01:59)                 Impression:      Hyperdense focus visualized on CT represents an exophytic 4.2 cm fundal uterine fibroid.    No significant ovarian cyst.  Small follicles noted.      Electronically signed by: Antony Prasad  Date:    07/22/2020  Time:    19:01             Narrative:    EXAMINATION:  US PELVIS COMPLETE NON OB    CLINICAL HISTORY:  r/o ovarian torsion;    TECHNIQUE:  Transabdominal and transvaginal images were acquired.    COMPARISON:  CT renal stone same date    FINDINGS:  The uterus measures 9.0 x 4.3 x 5.8cm.  There is a hypoechoic, exophytic lesion arising off the uterine fundus measuring 2.9 x 2.3 x 4.2 cm.  The endometrial thickness is 8mm.  There is no endometrial fluid.    The right ovary measures 2.9 x 1.5 x 2.0 cm.  Appropriate ovarian  flow.  No focal lesion.    The left ovary 3.5 x 1.5 x 2.9 cm.  Appropriate ovarian flow.  Small follicles are noted.    There is no free fluid in the visualized pelvis.                               CT Renal Stone Study Abd Pelvis WO (Final result)  Result time 07/22/20 17:24:36   Procedure changed from CT Abdomen Pelvis  Without Contrast     Final result by Antony Prasad MD (07/22/20 17:24:36)                 Impression:      4.1 cm ovoid hyperdensity within the anterior left adnexa may reflect a decompressed loop of bowel or hemorrhagic ovarian cyst.  Pelvic ultrasound could be utilized for further evaluation if clinically indicated.    Punctate nonobstructing left renal nephrolith.  No hydronephrosis.    No evidence of bowel obstruction or inflammatory change.    All CT scans at this facility use dose modulation, iterative reconstruction, and/or weight based dosing when appropriate to reduce radiation dose to as low as reasonable achievable.      Electronically signed by: Antony Prasad  Date:    07/22/2020  Time:    17:24             Narrative:    EXAMINATION:  CT RENAL STONE STUDY ABD PELVIS WO    CLINICAL HISTORY:  L flank pain;    TECHNIQUE:  Low dose axial images, sagittal and coronal reformations were obtained from the lung bases to the pubic symphysis.  Contrast was not administered.    COMPARISON:  None    FINDINGS:  Heart: Normal in size. No pericardial effusion.    Lung Bases: Well aerated, without consolidation or pleural fluid.    Liver: Normal in size and attenuation, with no focal hepatic lesions.    Gallbladder: Contracted.  No calcified gallstones.    Bile Ducts: No evidence of dilated ducts.    Pancreas: No mass or peripancreatic fat stranding.    Spleen: Unremarkable.    Adrenals: Unremarkable.    Kidneys/ Ureters: Normal in size and position bilaterally.  There is a right renal cyst.  Punctate nonobstructing left renal stone.  No hydronephrosis.  The ureters are nondilated.    Bladder: No  evidence of wall thickening.    Reproductive organs: Uterus appears within normal limits.  Rounded hyperdensity within the anterior left adnexa measuring 4.1 cm may reflect a non-opacified loop bowel or a hyperdense adnexal cyst.    GI Tract/Mesentery: No evidence of bowel obstruction or inflammation.  The appendix is visualized and appears within normal limits.    Peritoneal Space: No organized intra-abdominal collection.  No ascites.  No free air.    Retroperitoneum: No significant adenopathy.    Abdominal wall: Unremarkable.    Vasculature: No significant atherosclerosis or aneurysm.    Bones: No acute fracture.                                 Medical Decision Making:   Initial Assessment:   35-year-old female presenting with 2 days of left flank pain.  Vital signs are unremarkable.  She has some mild left lower quadrant tenderness to palpation but no tenderness in the left flank.  Pain is reproduced with movement.  Labs unremarkable.  CT shows 4.1 cm ovoid hyperdensity within the anterior left adnexa may reflect a decompressed loop of bowel or hemorrhagic ovarian cyst.  Pelvic u/s obtained for further clarification.  U/s shows that hyperdensity represents fibroid. D/c home with pain control.                                  Clinical Impression:       ICD-10-CM ICD-9-CM   1. Fibroid  D21.9 215.9   2. Acute left-sided low back pain without sciatica  M54.5 724.2             ED Disposition Condition    Discharge Stable        ED Prescriptions     Medication Sig Dispense Start Date End Date Auth. Provider    cyclobenzaprine (FLEXERIL) 10 MG tablet () Take 1 tablet (10 mg total) by mouth 3 (three) times daily as needed for Muscle spasms. 15 tablet 2020 Shana Jain MD    naproxen (NAPROSYN) 375 MG tablet Take 1 tablet (375 mg total) by mouth 2 (two) times daily with meals. 30 tablet 2020  Shana Jain MD    HYDROcodone-acetaminophen (NORCO) 5-325 mg per tablet Take 1 tablet by  mouth every 4 (four) hours as needed for Pain. 10 tablet 7/22/2020 8/1/2020 Shana Jain MD        Follow-up Information     Follow up With Specialties Details Why Contact Info    Your gynecologist        [facility]  As needed [address] [facility] [address]  [phone]                                     Sarmad Rincon MD  07/30/20 2429

## 2020-07-23 NOTE — DISCHARGE INSTRUCTIONS
You may use a heating pad to your abdomen and back.  No heavy lifting.  Call your gynecologist as soon as possible.  Also follow-up with your primary care physician regarding the back pain.  Return here if needed

## 2020-08-21 ENCOUNTER — TELEPHONE (OUTPATIENT)
Dept: FAMILY MEDICINE | Facility: CLINIC | Age: 35
End: 2020-08-21

## 2020-08-21 NOTE — TELEPHONE ENCOUNTER
----- Message from Lola Knight sent at 8/21/2020  4:24 PM CDT -----  Regarding: Needs Medical Advice  Contact: [phone]/self  Type:  Needs Medical Advice    Who Called:  self  Symptoms (please be specific):  a painful cyst and her appt with OB/GYN is next week   How long has patient had these symptoms:     Pharmacy name and phone #:    [id number] [address] [facility], [facility];  Would the patient rather a call back or a response via [facility]?  call  Best Call Back Number:  [phone]/self  Additional Information:  she started hurting again and used all the meds she got from the ER  She had to double the HYDROcodone-acetaminophen (NORCO) 5-325 mg per tablets. Her pain is severe and she has an appt with a new OB/GYN at [facility]

## 2020-08-22 RX ORDER — HYDROCODONE BITARTRATE AND ACETAMINOPHEN 10; 325 MG/1; MG/1
1 TABLET ORAL EVERY 6 HOURS PRN
Qty: 15 TABLET | Refills: 0 | Status: SHIPPED | OUTPATIENT
Start: 2020-08-22 | End: 2020-11-05

## 2020-10-05 ENCOUNTER — PATIENT MESSAGE (OUTPATIENT)
Dept: INTERNAL MEDICINE | Facility: CLINIC | Age: 35
End: 2020-10-05

## 2020-10-19 ENCOUNTER — PATIENT MESSAGE (OUTPATIENT)
Dept: FAMILY MEDICINE | Facility: CLINIC | Age: 35
End: 2020-10-19

## 2020-10-21 ENCOUNTER — TELEPHONE (OUTPATIENT)
Dept: FAMILY MEDICINE | Facility: CLINIC | Age: 35
End: 2020-10-21

## 2020-10-21 DIAGNOSIS — E66.9 OBESITY (BMI 30-39.9): Primary | ICD-10-CM

## 2020-10-21 DIAGNOSIS — Z11.59 ENCOUNTER FOR HEPATITIS C SCREENING TEST FOR LOW RISK PATIENT: ICD-10-CM

## 2020-10-21 NOTE — TELEPHONE ENCOUNTER
----- Message from Lola Knight sent at 10/21/2020  9:31 AM CDT -----  Regarding: annual labs  Contact: [phone]/self  Type:  Needs Medical Advice    Who Called: Patient is requesting orders for [facility] lab work sent to [facility]   Would the patient rather a call back or a response via [facility]?  call  Best Call Back Number:  [phone]/self  Additional Information:

## 2020-10-23 ENCOUNTER — PATIENT OUTREACH (OUTPATIENT)
Dept: ADMINISTRATIVE | Facility: OTHER | Age: 35
End: 2020-10-23

## 2020-10-23 NOTE — PROGRESS NOTES
Health Maintenance Due   Topic Date Due    Hepatitis C Screening  1984    Lipid Panel  1984    HIV Screening  02/23/1999    Cervical Cancer Screening  02/23/2005    Influenza Vaccine (1) 08/01/2020     Updates were requested from care everywhere.  Chart was reviewed for overdue [facility]active [facility]counters ([facility]) topics (CRS, Breast Cancer Screening, Eye exam)  Health Maintenance has been updated.  LINKS immunization registry triggered.  Immunizations were reconciled.

## 2020-10-29 ENCOUNTER — PATIENT MESSAGE (OUTPATIENT)
Dept: FAMILY MEDICINE | Facility: CLINIC | Age: 35
End: 2020-10-29

## 2020-10-29 ENCOUNTER — TELEPHONE (OUTPATIENT)
Dept: FAMILY MEDICINE | Facility: CLINIC | Age: 35
End: 2020-10-29

## 2020-10-30 ENCOUNTER — OFFICE VISIT (OUTPATIENT)
Dept: FAMILY MEDICINE | Facility: CLINIC | Age: 35
End: 2020-10-30
Payer: MEDICAID

## 2020-10-30 VITALS
TEMPERATURE: 99 F | WEIGHT: 171.5 LBS | DIASTOLIC BLOOD PRESSURE: 74 MMHG | HEIGHT: 61 IN | SYSTOLIC BLOOD PRESSURE: 114 MMHG | HEART RATE: 86 BPM | BODY MASS INDEX: 32.38 KG/M2 | OXYGEN SATURATION: 97 %

## 2020-10-30 DIAGNOSIS — I50.9 CONGESTIVE HEART FAILURE, UNSPECIFIED HF CHRONICITY, UNSPECIFIED HEART FAILURE TYPE: ICD-10-CM

## 2020-10-30 DIAGNOSIS — E55.9 VITAMIN D DEFICIENCY: ICD-10-CM

## 2020-10-30 DIAGNOSIS — J30.1 SEASONAL ALLERGIC RHINITIS DUE TO POLLEN: ICD-10-CM

## 2020-10-30 DIAGNOSIS — Z01.419 ROUTINE GYNECOLOGICAL EXAMINATION: ICD-10-CM

## 2020-10-30 DIAGNOSIS — I10 HYPERTENSION, UNSPECIFIED TYPE: ICD-10-CM

## 2020-10-30 DIAGNOSIS — M54.9 BACK PAIN, UNSPECIFIED BACK LOCATION, UNSPECIFIED BACK PAIN LATERALITY, UNSPECIFIED CHRONICITY: Primary | ICD-10-CM

## 2020-10-30 DIAGNOSIS — E66.9 CLASS 1 OBESITY WITH BODY MASS INDEX (BMI) OF 32.0 TO 32.9 IN ADULT, UNSPECIFIED OBESITY TYPE, UNSPECIFIED WHETHER SERIOUS COMORBIDITY PRESENT: ICD-10-CM

## 2020-10-30 PROCEDURE — 99214 OFFICE O/P EST MOD 30 MIN: CPT | Mod: S$GLB,,, | Performed by: FAMILY MEDICINE

## 2020-10-30 PROCEDURE — 99214 PR OFFICE/OUTPT VISIT, EST, LEVL IV, 30-39 MIN: ICD-10-PCS | Mod: S$GLB,,, | Performed by: FAMILY MEDICINE

## 2020-10-30 RX ORDER — PHENTERMINE HYDROCHLORIDE 37.5 MG/1
37.5 CAPSULE ORAL EVERY MORNING
Qty: 30 CAPSULE | Refills: 0 | Status: SHIPPED | OUTPATIENT
Start: 2020-10-30 | End: 2020-11-29

## 2020-10-30 RX ORDER — LOSARTAN POTASSIUM 50 MG/1
50 TABLET ORAL DAILY
Qty: 90 TABLET | Refills: 3 | Status: SHIPPED | OUTPATIENT
Start: 2020-10-30 | End: 2022-01-27 | Stop reason: SDUPTHER

## 2020-10-30 RX ORDER — SPIRONOLACTONE 50 MG/1
50 TABLET, FILM COATED ORAL DAILY
Qty: 90 TABLET | Refills: 3 | Status: SHIPPED | OUTPATIENT
Start: 2020-10-30 | End: 2021-11-12

## 2020-10-30 RX ORDER — POTASSIUM CHLORIDE 20 MEQ/1
20 TABLET, EXTENDED RELEASE ORAL DAILY
Qty: 90 TABLET | Refills: 3 | Status: SHIPPED | OUTPATIENT
Start: 2020-10-30 | End: 2022-01-27 | Stop reason: SDUPTHER

## 2020-10-30 RX ORDER — LORATADINE 10 MG/1
10 TABLET ORAL DAILY
Qty: 90 TABLET | Refills: 3 | Status: SHIPPED | OUTPATIENT
Start: 2020-10-30 | End: 2022-01-27 | Stop reason: SDUPTHER

## 2020-10-30 RX ORDER — METOPROLOL SUCCINATE 50 MG/1
50 TABLET, EXTENDED RELEASE ORAL DAILY
Qty: 90 TABLET | Refills: 3 | Status: SHIPPED | OUTPATIENT
Start: 2020-10-30 | End: 2020-11-02 | Stop reason: SDUPTHER

## 2020-10-30 RX ORDER — FUROSEMIDE 80 MG/1
80 TABLET ORAL DAILY
Qty: 90 TABLET | Refills: 3 | Status: SHIPPED | OUTPATIENT
Start: 2020-10-30 | End: 2021-11-12

## 2020-10-30 NOTE — PROGRESS NOTES
Subjective:       Patient ID: Anne Rangel is a 35 y.o. female.    Chief Complaint: Annual Exam    CHF:  Had a single episode of post partum CHF 17 years ago.  No further exacerbations since.     Back Pain  This is a chronic problem. The current episode started more than 1 year ago. The problem occurs constantly. The problem is unchanged. The pain is present in the lumbar spine. The quality of the pain is described as aching. The pain does not radiate. The pain is moderate. The pain is the same all the time. The symptoms are aggravated by bending, position and twisting. Pertinent negatives include no abdominal pain, bladder incontinence, bowel incontinence, chest pain, dysuria, fever, headaches, leg pain, numbness, paresis, paresthesias, pelvic pain, perianal numbness, tingling, weakness or weight loss. She has tried chiropractic manipulation and NSAIDs for the symptoms. The treatment provided mild relief.   Hypertension  This is a chronic problem. The current episode started more than 1 year ago. The problem is unchanged. The problem is controlled. Pertinent negatives include no anxiety, blurred vision, chest pain, headaches, malaise/fatigue, neck pain, orthopnea, palpitations, peripheral edema, PND, shortness of breath or sweats. There are no associated agents to hypertension. Risk factors for coronary artery disease include obesity. Past treatments include angiotensin blockers, beta blockers and diuretics. The current treatment provides significant improvement.     Review of Systems   Constitutional: Negative for activity change, appetite change, chills, fatigue, fever, malaise/fatigue and weight loss.   HENT: Negative for nasal congestion, ear discharge, ear pain, rhinorrhea, sore throat and trouble swallowing.    Eyes: Negative for blurred vision, photophobia, pain, redness, itching and visual disturbance.   Respiratory: Negative for cough, chest tightness, shortness of breath and wheezing.     Cardiovascular: Negative for chest pain, palpitations, orthopnea, leg swelling and PND.   Gastrointestinal: Negative for abdominal distention, abdominal pain, blood in stool, bowel incontinence, diarrhea, nausea and vomiting.   Genitourinary: Negative for bladder incontinence, dysuria, pelvic pain, vaginal bleeding, vaginal discharge and vaginal pain.   Musculoskeletal: Positive for back pain. Negative for arthralgias, gait problem, leg pain and neck pain.   Integumentary:  Negative for color change, pallor and rash.   Neurological: Negative for dizziness, tingling, tremors, weakness, light-headedness, numbness, headaches and paresthesias.   Psychiatric/Behavioral: Negative for agitation, behavioral problems, confusion and sleep disturbance.         Past Medical History:   Diagnosis Date    Asthma     CHF (congestive heart failure)     Hypertension     Stroke      Social History     Socioeconomic History    Marital status: Single     Spouse name: Not on file    Number of children: Not on file    Years of education: Not on file    Highest education level: Not on file   Occupational History    Not on file   Social Needs    Financial resource strain: Not on file    Food insecurity     Worry: Not on file     Inability: Not on file    Transportation needs     Medical: Not on file     Non-medical: Not on file   Tobacco Use    Smoking status: Never Smoker    Smokeless tobacco: Never Used   Substance and Sexual Activity    Alcohol use: Yes     Comment: occasionally    Drug use: No    Sexual activity: Not on file   Lifestyle    Physical activity     Days per week: Not on file     Minutes per session: Not on file    Stress: Not on file   Relationships    Social connections     Talks on phone: Not on file     Gets together: Not on file     Attends Bahai service: Not on file     Active member of club or organization: Not on file     Attends meetings of clubs or organizations: Not on file      "Relationship status: Not on file   Other Topics Concern    Not on file   Social History Narrative    Not on file       Objective:     /74 (BP Location: Right arm, Patient Position: Sitting)   Pulse 86   Temp 98.5 °F (36.9 °C) (Temporal)   Ht 5' 1" (1.549 m)   Wt 77.8 kg (171 lb 8.3 oz)   SpO2 97%   BMI 32.41 kg/m²     Physical Exam  Constitutional:       Appearance: She is well-developed.   HENT:      Head: Normocephalic.   Eyes:      Conjunctiva/sclera: Conjunctivae normal.   Neck:      Musculoskeletal: Normal range of motion and neck supple.   Cardiovascular:      Rate and Rhythm: Normal rate and regular rhythm.      Heart sounds: Normal heart sounds.   Pulmonary:      Effort: Pulmonary effort is normal.      Breath sounds: Normal breath sounds.   Skin:     General: Skin is warm and dry.   Neurological:      Mental Status: She is alert.   Psychiatric:         Behavior: Behavior normal.         Assessment:       1. Back pain, unspecified back location, unspecified back pain laterality, unspecified chronicity    2. Routine gynecological examination    3. Congestive heart failure, unspecified HF chronicity, unspecified heart failure type    4. Seasonal allergic rhinitis due to pollen    5. Class 1 obesity with body mass index (BMI) of 32.0 to 32.9 in adult, unspecified obesity type, unspecified whether serious comorbidity present    6. Hypertension, unspecified type    7. Vitamin D deficiency        Plan:       Back pain, unspecified back location, unspecified back pain laterality, unspecified chronicity  -     Ambulatory referral/consult to Pain Clinic; Future; Expected date: 11/06/2020    Routine gynecological examination  -     Ambulatory referral/consult to Obstetrics / Gynecology; Future; Expected date: 11/06/2020    Congestive heart failure, unspecified HF chronicity, unspecified heart failure type  -     Ambulatory referral/consult to Cardiology; Future; Expected date: 11/06/2020  -     " furosemide (LASIX) 80 MG tablet; Take 1 tablet (80 mg total) by mouth once daily.  Dispense: 90 tablet; Refill: 3  -     metoprolol succinate (TOPROL-XL) 50 MG 24 hr tablet; Take 1 tablet (50 mg total) by mouth once daily.  Dispense: 90 tablet; Refill: 3  -     spironolactone (ALDACTONE) 50 MG tablet; Take 1 tablet (50 mg total) by mouth once daily.  Dispense: 90 tablet; Refill: 3  -     potassium chloride SA (K-DUR,KLOR-CON) 20 MEQ tablet; Take 1 tablet (20 mEq total) by mouth once daily.  Dispense: 90 tablet; Refill: 3  -     CBC Auto Differential; Future; Expected date: 10/30/2020  -     Comprehensive Metabolic Panel; Future; Expected date: 10/30/2020  -     Lipid Panel; Future; Expected date: 10/30/2020    Seasonal allergic rhinitis due to pollen  -     loratadine (CLARITIN) 10 mg tablet; Take 1 tablet (10 mg total) by mouth once daily.  Dispense: 90 tablet; Refill: 3    Class 1 obesity with body mass index (BMI) of 32.0 to 32.9 in adult, unspecified obesity type, unspecified whether serious comorbidity present  -     phentermine (ADIPEX-P) 37.5 MG capsule; Take 1 capsule (37.5 mg total) by mouth every morning.  Dispense: 30 capsule; Refill: 0    Hypertension, unspecified type  -     losartan (COZAAR) 50 MG tablet; Take 1 tablet (50 mg total) by mouth once daily.  Dispense: 90 tablet; Refill: 3    Vitamin D deficiency  -     Vitamin D; Future; Expected date: 01/30/2021

## 2020-11-01 ENCOUNTER — PATIENT MESSAGE (OUTPATIENT)
Dept: FAMILY MEDICINE | Facility: CLINIC | Age: 35
End: 2020-11-01

## 2020-11-02 DIAGNOSIS — I50.9 CONGESTIVE HEART FAILURE, UNSPECIFIED HF CHRONICITY, UNSPECIFIED HEART FAILURE TYPE: ICD-10-CM

## 2020-11-03 ENCOUNTER — TELEPHONE (OUTPATIENT)
Dept: FAMILY MEDICINE | Facility: CLINIC | Age: 35
End: 2020-11-03

## 2020-11-03 ENCOUNTER — PATIENT MESSAGE (OUTPATIENT)
Dept: FAMILY MEDICINE | Facility: CLINIC | Age: 35
End: 2020-11-03

## 2020-11-03 RX ORDER — METOPROLOL SUCCINATE 50 MG/1
50 TABLET, EXTENDED RELEASE ORAL DAILY
Qty: 90 TABLET | Refills: 3 | Status: SHIPPED | OUTPATIENT
Start: 2020-11-03 | End: 2021-01-20 | Stop reason: SDUPTHER

## 2020-11-03 RX ORDER — PHENTERMINE HYDROCHLORIDE 37.5 MG/1
37.5 TABLET ORAL
Qty: 30 TABLET | Refills: 0 | Status: SHIPPED | OUTPATIENT
Start: 2020-11-03 | End: 2020-12-03

## 2020-11-05 ENCOUNTER — OFFICE VISIT (OUTPATIENT)
Dept: OBSTETRICS AND GYNECOLOGY | Facility: CLINIC | Age: 35
End: 2020-11-05
Payer: MEDICAID

## 2020-11-05 ENCOUNTER — PATIENT MESSAGE (OUTPATIENT)
Dept: FAMILY MEDICINE | Facility: CLINIC | Age: 35
End: 2020-11-05

## 2020-11-05 VITALS
SYSTOLIC BLOOD PRESSURE: 108 MMHG | WEIGHT: 171.75 LBS | DIASTOLIC BLOOD PRESSURE: 66 MMHG | BODY MASS INDEX: 32.45 KG/M2

## 2020-11-05 DIAGNOSIS — Z01.419 ROUTINE GYNECOLOGICAL EXAMINATION: ICD-10-CM

## 2020-11-05 DIAGNOSIS — Z12.4 SCREENING FOR CERVICAL CANCER: ICD-10-CM

## 2020-11-05 DIAGNOSIS — D25.9 UTERINE LEIOMYOMA, UNSPECIFIED LOCATION: ICD-10-CM

## 2020-11-05 DIAGNOSIS — Z30.09 ENCOUNTER FOR OTHER GENERAL COUNSELING OR ADVICE ON CONTRACEPTION: ICD-10-CM

## 2020-11-05 DIAGNOSIS — Z01.419 WELL WOMAN EXAM WITH ROUTINE GYNECOLOGICAL EXAM: Primary | ICD-10-CM

## 2020-11-05 PROCEDURE — 99385 PR PREVENTIVE VISIT,NEW,18-39: ICD-10-PCS | Mod: S$PBB,,, | Performed by: OBSTETRICS & GYNECOLOGY

## 2020-11-05 PROCEDURE — 99213 OFFICE O/P EST LOW 20 MIN: CPT | Mod: PBBFAC,PO | Performed by: OBSTETRICS & GYNECOLOGY

## 2020-11-05 PROCEDURE — 99999 PR PBB SHADOW E&M-EST. PATIENT-LVL III: ICD-10-PCS | Mod: PBBFAC,,, | Performed by: OBSTETRICS & GYNECOLOGY

## 2020-11-05 PROCEDURE — 99999 PR PBB SHADOW E&M-EST. PATIENT-LVL III: CPT | Mod: PBBFAC,,, | Performed by: OBSTETRICS & GYNECOLOGY

## 2020-11-05 PROCEDURE — 99385 PREV VISIT NEW AGE 18-39: CPT | Mod: S$PBB,,, | Performed by: OBSTETRICS & GYNECOLOGY

## 2020-11-05 PROCEDURE — 87624 HPV HI-RISK TYP POOLED RSLT: CPT

## 2020-11-05 PROCEDURE — 88175 CYTOPATH C/V AUTO FLUID REDO: CPT

## 2020-11-05 RX ORDER — HYDROCODONE BITARTRATE AND ACETAMINOPHEN 10; 325 MG/1; MG/1
1 TABLET ORAL EVERY 6 HOURS PRN
Qty: 15 TABLET | Refills: 0 | Status: SHIPPED | OUTPATIENT
Start: 2020-11-05

## 2020-11-05 NOTE — PROGRESS NOTES
GYNECOLOGY OFFICE NOTE    Reason for visit: annual    HPI: Pt is a 35 y.o.  female  who presents for annual. Cycle: menarche- 10, Interval-  Q month, Duration- 3-5 days, Flow- normal (changing maxi pad 3 times a day), reports dysmenorrhea the first day only- no alleviated with ibuprofen. She is sexually active.  She uses bilateral tubal ligation for contraception.  She does not desire STI screening. She denies vaginal discharge.  Last pap: , denies hx of abnormal. Pt states she wasn't aware of fibroids or what they are but surgical history includes myomectomy. Pt also reports lump in right breast x 2 months but thinks its associated with menstrual cycle.     Pelvic US:2020-   The uterus measures 9.0 x 4.3 x 5.8cm.  There is a hypoechoic, exophytic lesion arising off the uterine fundus measuring 2.9 x 2.3 x 4.2 cm.  The endometrial thickness is 8mm.  There is no endometrial fluid. The right ovary measures 2.9 x 1.5 x 2.0 cm.  Appropriate ovarian flow.  No focal lesion.The left ovary 3.5 x 1.5 x 2.9 cm.  Appropriate ovarian flow.  Small follicles are noted.   There is no free fluid in the visualized pelvis.       Past Medical History:   Diagnosis Date    Asthma     CHF (congestive heart failure)     Hypertension     Stroke        Past Surgical History:   Procedure Laterality Date    MYOMECTOMY      TUBAL LIGATION         Family History   Problem Relation Age of Onset    Ovarian cancer Maternal Grandmother     Breast cancer Neg Hx     Colon cancer Neg Hx        Social History     Tobacco Use    Smoking status: Never Smoker    Smokeless tobacco: Never Used   Substance Use Topics    Alcohol use: Yes     Comment: occasionally    Drug use: No       OB History    Para Term  AB Living   3 2     1 2   SAB TAB Ectopic Multiple Live Births     1     2      # Outcome Date GA Lbr Arnie/2nd Weight Sex Delivery Anes PTL Lv   3 TAB            2 Para      Vag-Spont   JAYSON   1 Para       Vag-Spont   JAYSON       Current Outpatient Medications   Medication Sig    aspirin 81 MG Chew Take 81 mg by mouth once daily.    furosemide (LASIX) 80 MG tablet Take 1 tablet (80 mg total) by mouth once daily.    loratadine (CLARITIN) 10 mg tablet Take 1 tablet (10 mg total) by mouth once daily.    losartan (COZAAR) 50 MG tablet Take 1 tablet (50 mg total) by mouth once daily.    magnesium oxide-pyridoxine (BEELITH) 362-20 mg Tab Take 1 tablet by mouth once.    metoprolol succinate (TOPROL-XL) 50 MG 24 hr tablet Take 1 tablet (50 mg total) by mouth once daily.    phentermine (ADIPEX-P) 37.5 MG capsule Take 1 capsule (37.5 mg total) by mouth every morning.    phentermine (ADIPEX-P) 37.5 mg tablet Take 1 tablet (37.5 mg total) by mouth before breakfast.    potassium chloride SA (K-DUR,KLOR-CON) 20 MEQ tablet Take 1 tablet (20 mEq total) by mouth once daily.    spironolactone (ALDACTONE) 50 MG tablet Take 1 tablet (50 mg total) by mouth once daily.    naproxen (NAPROSYN) 375 MG tablet Take 1 tablet (375 mg total) by mouth 2 (two) times daily with meals. (Patient not taking: Reported on 11/5/2020)     No current facility-administered medications for this visit.        Allergies: Patient has no known allergies.     /66   Wt 77.9 kg (171 lb 11.8 oz)   LMP 10/10/2020 (Approximate)   BMI 32.45 kg/m²     ROS:  GENERAL: Denies fever or chills.   SKIN: Denies rash or lesions.   HEAD: Denies head injury or headache.   CHEST: Denies chest pain or shortness of breath.   CARDIOVASCULAR: Denies palpitations or chest pain.   ABDOMEN: No constipation, diarrhea, nausea, vomiting or rectal bleeding.   URINARY: No dysuria, hematuria, or burning on urination.  REPRODUCTIVE: See HPI.   BREASTS: see HPI  NEUROLOGIC: Denies syncope or weakness.     Physical Exam:  GENERAL: alert, appears stated age and cooperative  NEUROLOGIC: orientated to person, place and time, normal mood and affect   CHEST: Normal respiratory  effort  NECK: normal appearance  SKIN: no acne, hirsutism  BREAST EXAM: breasts appear normal, no suspicious masses, no skin or nipple changes or axillary nodes  ABDOMEN: abdomen is soft without significant tenderness, masses  EXTERNAL GENITALIA:  normal general appearance  URETHRA: normal urethra, normal urethral meatus  VAGINA:  normal mucosa, no  lesions  CERVIX:  Normal  UTERUS:  mobile, non tender  ADNEXA: nontender    Diagnosis:  1. Well woman exam with routine gynecological exam    2. Routine gynecological examination    3. Screening for cervical cancer    4. Uterine leiomyoma, unspecified location    5. Encounter for other general counseling or advice on contraception        Plan:   1/2. Annual  3. Pap/hpv  4-5. Uterine fibroids- JOAQUIN from [facility]. Pt states she had an IUD that was ripped out and it pulled out. Pt states she is unaware of myomectomy. Discussed options for management of fibroid. Pt interested in IUD. Order placed    Orders Placed This Encounter    HPV High Risk Genotypes, PCR    Device Authorization Order    Liquid-Based Pap Smear, Screening         RTC IUD insertion    Patient was counseled today on the new ACS guidelines for cervical cytology screening as well as the current recommendations for breast cancer screening. She was counseled to follow up with her PCP for other routine health maintenance.       Anya Nice MD  OB/GYN

## 2020-11-16 LAB
HPV HR 12 DNA SPEC QL NAA+PROBE: NEGATIVE
HPV16 AG SPEC QL: NEGATIVE
HPV18 DNA SPEC QL NAA+PROBE: NEGATIVE

## 2020-11-18 ENCOUNTER — TELEPHONE (OUTPATIENT)
Dept: OBSTETRICS AND GYNECOLOGY | Facility: CLINIC | Age: 35
End: 2020-11-18

## 2020-11-18 NOTE — TELEPHONE ENCOUNTER
Contacted pt and informed we received her Mirena in the office today. Pt states she is [facility] the line at the bank and will call back to schedule.

## 2020-12-03 ENCOUNTER — TELEPHONE (OUTPATIENT)
Dept: FAMILY MEDICINE | Facility: CLINIC | Age: 35
End: 2020-12-03

## 2020-12-03 ENCOUNTER — PATIENT MESSAGE (OUTPATIENT)
Dept: FAMILY MEDICINE | Facility: CLINIC | Age: 35
End: 2020-12-03

## 2020-12-03 RX ORDER — FLUCONAZOLE 150 MG/1
150 TABLET ORAL DAILY
Qty: 2 TABLET | Refills: 0 | Status: SHIPPED | OUTPATIENT
Start: 2020-12-03 | End: 2020-12-05

## 2020-12-03 NOTE — TELEPHONE ENCOUNTER
----- Message from Shima Davenport sent at 12/3/2020 10:32 AM CST -----  Contact: WILLY MAS [[id number]]  Type:  Patient Requesting Call    Who Called: Willy Tirado Call Back Number: [phone]  Additional Information:  requesting to speak to someone regarding a feminine problem

## 2020-12-03 NOTE — TELEPHONE ENCOUNTER
Pt has sent a portal message regarding this issue. I've forwarded that message to the provider for review.

## 2020-12-04 ENCOUNTER — PATIENT MESSAGE (OUTPATIENT)
Dept: FAMILY MEDICINE | Facility: CLINIC | Age: 35
End: 2020-12-04

## 2020-12-14 LAB
FINAL PATHOLOGIC DIAGNOSIS: NORMAL
Lab: NORMAL

## 2021-01-07 ENCOUNTER — HOSPITAL ENCOUNTER (EMERGENCY)
Facility: HOSPITAL | Age: 36
Discharge: HOME | End: 2021-01-07
Attending: EMERGENCY MEDICINE
Payer: MEDICAID

## 2021-01-07 VITALS
OXYGEN SATURATION: 100 % | WEIGHT: 145 LBS | SYSTOLIC BLOOD PRESSURE: 135 MMHG | DIASTOLIC BLOOD PRESSURE: 87 MMHG | RESPIRATION RATE: 21 BRPM | BODY MASS INDEX: 27.38 KG/M2 | HEART RATE: 86 BPM | HEIGHT: 61 IN | TEMPERATURE: 99 F

## 2021-01-07 DIAGNOSIS — B34.9 VIRAL SYNDROME: Primary | ICD-10-CM

## 2021-01-07 LAB
B-HCG UR QL: NEGATIVE
INFLUENZA A, MOLECULAR: NEGATIVE
INFLUENZA B, MOLECULAR: NEGATIVE
SARS-COV-2 RDRP RESP QL NAA+PROBE: NEGATIVE
SPECIMEN SOURCE: NORMAL

## 2021-01-07 PROCEDURE — 99282 EMERGENCY DEPT VISIT SF MDM: CPT | Mod: 25,ER

## 2021-01-07 PROCEDURE — U0002 COVID-19 LAB TEST NON-CDC: HCPCS | Mod: ER

## 2021-01-07 PROCEDURE — 87502 INFLUENZA DNA AMP PROBE: CPT | Mod: ER

## 2021-01-07 PROCEDURE — 81025 URINE PREGNANCY TEST: CPT | Mod: ER

## 2021-01-19 ENCOUNTER — TELEPHONE (OUTPATIENT)
Dept: FAMILY MEDICINE | Facility: CLINIC | Age: 36
End: 2021-01-19

## 2021-01-20 ENCOUNTER — OFFICE VISIT (OUTPATIENT)
Dept: FAMILY MEDICINE | Facility: CLINIC | Age: 36
End: 2021-01-20
Payer: MEDICAID

## 2021-01-20 VITALS
BODY MASS INDEX: 29.47 KG/M2 | OXYGEN SATURATION: 97 % | DIASTOLIC BLOOD PRESSURE: 78 MMHG | SYSTOLIC BLOOD PRESSURE: 110 MMHG | HEIGHT: 61 IN | WEIGHT: 156.06 LBS | HEART RATE: 101 BPM | TEMPERATURE: 98 F

## 2021-01-20 DIAGNOSIS — K59.00 CONSTIPATION, UNSPECIFIED CONSTIPATION TYPE: Primary | ICD-10-CM

## 2021-01-20 DIAGNOSIS — I50.9 CONGESTIVE HEART FAILURE, UNSPECIFIED HF CHRONICITY, UNSPECIFIED HEART FAILURE TYPE: ICD-10-CM

## 2021-01-20 DIAGNOSIS — R10.9 ABDOMINAL PAIN, UNSPECIFIED ABDOMINAL LOCATION: ICD-10-CM

## 2021-01-20 PROCEDURE — 99213 PR OFFICE/OUTPT VISIT, EST, LEVL III, 20-29 MIN: ICD-10-PCS | Mod: S$GLB,,, | Performed by: FAMILY MEDICINE

## 2021-01-20 PROCEDURE — 99213 OFFICE O/P EST LOW 20 MIN: CPT | Mod: S$GLB,,, | Performed by: FAMILY MEDICINE

## 2021-01-20 RX ORDER — METOPROLOL SUCCINATE 50 MG/1
50 TABLET, EXTENDED RELEASE ORAL DAILY
Qty: 90 TABLET | Refills: 3 | Status: SHIPPED | OUTPATIENT
Start: 2021-01-20 | End: 2022-01-27 | Stop reason: SDUPTHER

## 2021-01-20 RX ORDER — LUBIPROSTONE 24 UG/1
24 CAPSULE ORAL 2 TIMES DAILY WITH MEALS
Qty: 30 CAPSULE | Refills: 1 | Status: SHIPPED | OUTPATIENT
Start: 2021-01-20

## 2021-01-25 ENCOUNTER — PATIENT OUTREACH (OUTPATIENT)
Dept: ADMINISTRATIVE | Facility: OTHER | Age: 36
End: 2021-01-25

## 2021-01-27 ENCOUNTER — OFFICE VISIT (OUTPATIENT)
Dept: CARDIOLOGY | Facility: CLINIC | Age: 36
End: 2021-01-27
Payer: MEDICAID

## 2021-01-27 VITALS
WEIGHT: 155.44 LBS | SYSTOLIC BLOOD PRESSURE: 120 MMHG | DIASTOLIC BLOOD PRESSURE: 82 MMHG | HEART RATE: 84 BPM | BODY MASS INDEX: 29.35 KG/M2 | HEIGHT: 61 IN

## 2021-01-27 DIAGNOSIS — I10 ESSENTIAL HYPERTENSION: Primary | ICD-10-CM

## 2021-01-27 DIAGNOSIS — I50.9 CONGESTIVE HEART FAILURE, UNSPECIFIED HF CHRONICITY, UNSPECIFIED HEART FAILURE TYPE: ICD-10-CM

## 2021-01-27 PROCEDURE — 99204 OFFICE O/P NEW MOD 45 MIN: CPT | Mod: S$PBB,,, | Performed by: INTERNAL MEDICINE

## 2021-01-27 PROCEDURE — 99214 OFFICE O/P EST MOD 30 MIN: CPT | Mod: PBBFAC,PO | Performed by: INTERNAL MEDICINE

## 2021-01-27 PROCEDURE — 99999 PR PBB SHADOW E&M-EST. PATIENT-LVL IV: CPT | Mod: PBBFAC,,, | Performed by: INTERNAL MEDICINE

## 2021-01-27 PROCEDURE — 99999 PR PBB SHADOW E&M-EST. PATIENT-LVL IV: ICD-10-PCS | Mod: PBBFAC,,, | Performed by: INTERNAL MEDICINE

## 2021-01-27 PROCEDURE — 99204 PR OFFICE/OUTPT VISIT, NEW, LEVL IV, 45-59 MIN: ICD-10-PCS | Mod: S$PBB,,, | Performed by: INTERNAL MEDICINE

## 2021-01-27 RX ORDER — PHENTERMINE HYDROCHLORIDE 37.5 MG/1
1 TABLET ORAL
COMMUNITY

## 2021-01-27 RX ORDER — HYDROCODONE BITARTRATE AND ACETAMINOPHEN 10; 325 MG/1; MG/1
1 TABLET ORAL EVERY 6 HOURS PRN
Qty: 15 TABLET | Refills: 0 | OUTPATIENT
Start: 2021-01-27

## 2021-02-12 ENCOUNTER — HOSPITAL ENCOUNTER (EMERGENCY)
Facility: HOSPITAL | Age: 36
Discharge: HOME | End: 2021-02-12
Attending: FAMILY MEDICINE
Payer: MEDICAID

## 2021-02-12 VITALS
RESPIRATION RATE: 19 BRPM | BODY MASS INDEX: 27.38 KG/M2 | TEMPERATURE: 99 F | SYSTOLIC BLOOD PRESSURE: 115 MMHG | DIASTOLIC BLOOD PRESSURE: 79 MMHG | HEART RATE: 86 BPM | OXYGEN SATURATION: 99 % | HEIGHT: 61 IN | WEIGHT: 145 LBS

## 2021-02-12 DIAGNOSIS — R07.89 CHEST WALL PAIN: ICD-10-CM

## 2021-02-12 DIAGNOSIS — M25.511 SHOULDER PAIN, RIGHT: ICD-10-CM

## 2021-02-12 LAB
ALBUMIN SERPL BCP-MCNC: 5.2 G/DL (ref 3.5–5.2)
ALP SERPL-CCNC: 59 U/L (ref 38–126)
ALT SERPL W/O P-5'-P-CCNC: 35 U/L (ref 10–44)
ANION GAP SERPL CALC-SCNC: 10 MMOL/L (ref 8–16)
AST SERPL-CCNC: 28 U/L (ref 15–46)
BASOPHILS # BLD AUTO: 0.03 K/UL (ref 0–0.2)
BASOPHILS NFR BLD: 0.5 % (ref 0–1.9)
BILIRUB SERPL-MCNC: 0.9 MG/DL (ref 0.1–1)
BILIRUB UR QL STRIP: NEGATIVE
CALCIUM SERPL-MCNC: 9.9 MG/DL (ref 8.7–10.5)
CHLORIDE SERPL-SCNC: 99 MMOL/L (ref 95–110)
CLARITY UR REFRACT.AUTO: CLEAR
CO2 SERPL-SCNC: 33 MMOL/L (ref 23–29)
COLOR UR AUTO: YELLOW
CREAT SERPL-MCNC: 0.72 MG/DL (ref 0.5–1.4)
D DIMER PPP IA.FEU-MCNC: 0.36 MG/L FEU
DIFFERENTIAL METHOD: ABNORMAL
EOSINOPHIL # BLD AUTO: 0 K/UL (ref 0–0.5)
EOSINOPHIL NFR BLD: 0.5 % (ref 0–8)
ERYTHROCYTE [DISTWIDTH] IN BLOOD BY AUTOMATED COUNT: 12.9 % (ref 11.5–14.5)
EST. GFR  (AFRICAN AMERICAN): >60 ML/MIN/1.73 M^2
EST. GFR  (NON AFRICAN AMERICAN): >60 ML/MIN/1.73 M^2
GLUCOSE SERPL-MCNC: 93 MG/DL (ref 70–110)
GLUCOSE UR QL STRIP: NEGATIVE
HCT VFR BLD AUTO: 43.7 % (ref 37–48.5)
HGB BLD-MCNC: 14.3 G/DL (ref 12–16)
HGB UR QL STRIP: NEGATIVE
IMM GRANULOCYTES # BLD AUTO: 0.01 K/UL (ref 0–0.04)
IMM GRANULOCYTES NFR BLD AUTO: 0.2 % (ref 0–0.5)
KETONES UR QL STRIP: NEGATIVE
LEUKOCYTE ESTERASE UR QL STRIP: NEGATIVE
LYMPHOCYTES # BLD AUTO: 3.3 K/UL (ref 1–4.8)
LYMPHOCYTES NFR BLD: 52.7 % (ref 18–48)
MCH RBC QN AUTO: 29.9 PG (ref 27–31)
MCHC RBC AUTO-ENTMCNC: 32.7 G/DL (ref 32–36)
MCV RBC AUTO: 91 FL (ref 82–98)
MONOCYTES # BLD AUTO: 0.4 K/UL (ref 0.3–1)
MONOCYTES NFR BLD: 6.3 % (ref 4–15)
NEUTROPHILS # BLD AUTO: 2.5 K/UL (ref 1.8–7.7)
NEUTROPHILS NFR BLD: 39.8 % (ref 38–73)
NITRITE UR QL STRIP: NEGATIVE
NRBC BLD-RTO: 0 /100 WBC
PH UR STRIP: 7 [PH] (ref 5–8)
PLATELET # BLD AUTO: 374 K/UL (ref 150–350)
PMV BLD AUTO: 11.7 FL (ref 9.2–12.9)
POTASSIUM SERPL-SCNC: 3.7 MMOL/L (ref 3.5–5.1)
PROT SERPL-MCNC: 9.3 G/DL (ref 6–8.4)
PROT UR QL STRIP: NEGATIVE
RBC # BLD AUTO: 4.79 M/UL (ref 4–5.4)
SODIUM SERPL-SCNC: 142 MMOL/L (ref 136–145)
SP GR UR STRIP: 1 (ref 1–1.03)
TROPONIN I SERPL-MCNC: <0.012 NG/ML (ref 0.01–0.03)
URN SPEC COLLECT METH UR: NORMAL
UROBILINOGEN UR STRIP-ACNC: NEGATIVE EU/DL
UUN UR-MCNC: 11 MG/DL (ref 7–17)
WBC # BLD AUTO: 6.3 K/UL (ref 3.9–12.7)

## 2021-02-12 PROCEDURE — 80053 COMPREHEN METABOLIC PANEL: CPT | Mod: ER

## 2021-02-12 PROCEDURE — 85379 FIBRIN DEGRADATION QUANT: CPT | Mod: ER

## 2021-02-12 PROCEDURE — 93005 ELECTROCARDIOGRAM TRACING: CPT | Mod: ER

## 2021-02-12 PROCEDURE — 63600175 PHARM REV CODE 636 W HCPCS: Mod: ER | Performed by: FAMILY MEDICINE

## 2021-02-12 PROCEDURE — 96374 THER/PROPH/DIAG INJ IV PUSH: CPT | Mod: ER

## 2021-02-12 PROCEDURE — 93010 ELECTROCARDIOGRAM REPORT: CPT | Mod: ,,, | Performed by: INTERNAL MEDICINE

## 2021-02-12 PROCEDURE — 99285 EMERGENCY DEPT VISIT HI MDM: CPT | Mod: 25,ER

## 2021-02-12 PROCEDURE — 96372 THER/PROPH/DIAG INJ SC/IM: CPT | Mod: ER

## 2021-02-12 PROCEDURE — 84484 ASSAY OF TROPONIN QUANT: CPT | Mod: ER

## 2021-02-12 PROCEDURE — 81003 URINALYSIS AUTO W/O SCOPE: CPT | Mod: ER

## 2021-02-12 PROCEDURE — 93010 EKG 12-LEAD: ICD-10-PCS | Mod: ,,, | Performed by: INTERNAL MEDICINE

## 2021-02-12 PROCEDURE — 85025 COMPLETE CBC W/AUTO DIFF WBC: CPT | Mod: ER

## 2021-02-12 RX ORDER — IBUPROFEN 800 MG/1
800 TABLET ORAL 3 TIMES DAILY PRN
Qty: 30 TABLET | Refills: 0 | Status: SHIPPED | OUTPATIENT
Start: 2021-02-12

## 2021-02-12 RX ORDER — ORPHENADRINE CITRATE 30 MG/ML
60 INJECTION INTRAMUSCULAR; INTRAVENOUS
Status: COMPLETED | OUTPATIENT
Start: 2021-02-12 | End: 2021-02-12

## 2021-02-12 RX ORDER — KETOROLAC TROMETHAMINE 30 MG/ML
30 INJECTION, SOLUTION INTRAMUSCULAR; INTRAVENOUS
Status: COMPLETED | OUTPATIENT
Start: 2021-02-12 | End: 2021-02-12

## 2021-02-12 RX ORDER — METHOCARBAMOL 500 MG/1
1000 TABLET, FILM COATED ORAL 3 TIMES DAILY
Qty: 30 TABLET | Refills: 0 | Status: SHIPPED | OUTPATIENT
Start: 2021-02-12 | End: 2021-02-17

## 2021-02-12 RX ADMIN — KETOROLAC TROMETHAMINE 30 MG: 30 INJECTION, SOLUTION INTRAMUSCULAR at 02:02

## 2021-02-12 RX ADMIN — ORPHENADRINE CITRATE 60 MG: 60 INJECTION INTRAMUSCULAR; INTRAVENOUS at 02:02

## 2021-03-25 ENCOUNTER — HOSPITAL ENCOUNTER (EMERGENCY)
Facility: HOSPITAL | Age: 36
Discharge: HOME | End: 2021-03-25
Attending: EMERGENCY MEDICINE
Payer: MEDICAID

## 2021-03-25 VITALS
DIASTOLIC BLOOD PRESSURE: 70 MMHG | OXYGEN SATURATION: 100 % | HEART RATE: 90 BPM | BODY MASS INDEX: 26.43 KG/M2 | WEIGHT: 140 LBS | RESPIRATION RATE: 18 BRPM | HEIGHT: 61 IN | SYSTOLIC BLOOD PRESSURE: 116 MMHG | TEMPERATURE: 99 F

## 2021-03-25 DIAGNOSIS — K59.03 DRUG-INDUCED CONSTIPATION: Primary | ICD-10-CM

## 2021-03-25 PROCEDURE — 99283 EMERGENCY DEPT VISIT LOW MDM: CPT | Mod: ER

## 2021-03-25 RX ORDER — DOCUSATE SODIUM 100 MG/1
100 CAPSULE, LIQUID FILLED ORAL 2 TIMES DAILY
Qty: 60 CAPSULE | Refills: 0 | Status: SHIPPED | OUTPATIENT
Start: 2021-03-25 | End: 2022-01-27 | Stop reason: SDUPTHER

## 2021-11-12 DIAGNOSIS — I50.9 CONGESTIVE HEART FAILURE, UNSPECIFIED HF CHRONICITY, UNSPECIFIED HEART FAILURE TYPE: ICD-10-CM

## 2021-11-12 RX ORDER — FUROSEMIDE 80 MG/1
TABLET ORAL
Qty: 30 TABLET | Refills: 0 | Status: SHIPPED | OUTPATIENT
Start: 2021-11-12 | End: 2021-12-16 | Stop reason: SDUPTHER

## 2021-11-12 RX ORDER — SPIRONOLACTONE 50 MG/1
TABLET, FILM COATED ORAL
Qty: 30 TABLET | Refills: 0 | Status: SHIPPED | OUTPATIENT
Start: 2021-11-12 | End: 2021-12-16 | Stop reason: SDUPTHER

## 2021-12-15 DIAGNOSIS — I50.9 CONGESTIVE HEART FAILURE, UNSPECIFIED HF CHRONICITY, UNSPECIFIED HEART FAILURE TYPE: ICD-10-CM

## 2021-12-15 RX ORDER — FUROSEMIDE 80 MG/1
80 TABLET ORAL DAILY
Qty: 30 TABLET | Refills: 0 | OUTPATIENT
Start: 2021-12-15

## 2021-12-16 ENCOUNTER — HOSPITAL ENCOUNTER (EMERGENCY)
Facility: HOSPITAL | Age: 36
Discharge: HOME | End: 2021-12-16
Attending: EMERGENCY MEDICINE
Payer: MEDICAID

## 2021-12-16 VITALS
SYSTOLIC BLOOD PRESSURE: 135 MMHG | HEIGHT: 61 IN | OXYGEN SATURATION: 97 % | RESPIRATION RATE: 20 BRPM | BODY MASS INDEX: 29.27 KG/M2 | TEMPERATURE: 100 F | DIASTOLIC BLOOD PRESSURE: 76 MMHG | HEART RATE: 93 BPM | WEIGHT: 155 LBS

## 2021-12-16 DIAGNOSIS — I50.9 CONGESTIVE HEART FAILURE, UNSPECIFIED HF CHRONICITY, UNSPECIFIED HEART FAILURE TYPE: ICD-10-CM

## 2021-12-16 DIAGNOSIS — U07.1 COVID-19 VIRUS INFECTION: ICD-10-CM

## 2021-12-16 LAB
INFLUENZA A, MOLECULAR: NEGATIVE
INFLUENZA B, MOLECULAR: NEGATIVE
SARS-COV-2 RDRP RESP QL NAA+PROBE: POSITIVE
SPECIMEN SOURCE: NORMAL

## 2021-12-16 PROCEDURE — 63600175 PHARM REV CODE 636 W HCPCS: Mod: ER | Performed by: EMERGENCY MEDICINE

## 2021-12-16 PROCEDURE — 99284 EMERGENCY DEPT VISIT MOD MDM: CPT | Mod: 25,ER

## 2021-12-16 PROCEDURE — U0002 COVID-19 LAB TEST NON-CDC: HCPCS | Mod: ER | Performed by: EMERGENCY MEDICINE

## 2021-12-16 PROCEDURE — 25000003 PHARM REV CODE 250: Mod: ER | Performed by: EMERGENCY MEDICINE

## 2021-12-16 PROCEDURE — 96375 TX/PRO/DX INJ NEW DRUG ADDON: CPT | Mod: ER

## 2021-12-16 PROCEDURE — 96374 THER/PROPH/DIAG INJ IV PUSH: CPT | Mod: ER

## 2021-12-16 PROCEDURE — 87502 INFLUENZA DNA AMP PROBE: CPT | Mod: ER | Performed by: EMERGENCY MEDICINE

## 2021-12-16 RX ORDER — FUROSEMIDE 80 MG/1
80 TABLET ORAL DAILY
Qty: 90 TABLET | Refills: 3 | Status: SHIPPED | OUTPATIENT
Start: 2021-12-16 | End: 2021-12-16 | Stop reason: SDUPTHER

## 2021-12-16 RX ORDER — ONDANSETRON 2 MG/ML
4 INJECTION INTRAMUSCULAR; INTRAVENOUS
Status: COMPLETED | OUTPATIENT
Start: 2021-12-16 | End: 2021-12-16

## 2021-12-16 RX ORDER — SPIRONOLACTONE 50 MG/1
50 TABLET, FILM COATED ORAL DAILY
Qty: 90 TABLET | Refills: 3 | Status: SHIPPED | OUTPATIENT
Start: 2021-12-16 | End: 2022-01-27 | Stop reason: SDUPTHER

## 2021-12-16 RX ORDER — ONDANSETRON 4 MG/1
4 TABLET, ORALLY DISINTEGRATING ORAL EVERY 6 HOURS PRN
Qty: 30 TABLET | Refills: 0 | OUTPATIENT
Start: 2021-12-16 | End: 2023-09-05

## 2021-12-16 RX ORDER — KETOROLAC TROMETHAMINE 30 MG/ML
15 INJECTION, SOLUTION INTRAMUSCULAR; INTRAVENOUS
Status: COMPLETED | OUTPATIENT
Start: 2021-12-16 | End: 2021-12-16

## 2021-12-16 RX ORDER — FUROSEMIDE 80 MG/1
80 TABLET ORAL DAILY
Qty: 90 TABLET | Refills: 3 | Status: SHIPPED | OUTPATIENT
Start: 2021-12-16 | End: 2022-01-27 | Stop reason: SDUPTHER

## 2021-12-16 RX ADMIN — ONDANSETRON HYDROCHLORIDE 4 MG: 2 SOLUTION INTRAMUSCULAR; INTRAVENOUS at 09:12

## 2021-12-16 RX ADMIN — KETOROLAC TROMETHAMINE 15 MG: 30 INJECTION, SOLUTION INTRAMUSCULAR at 09:12

## 2021-12-16 RX ADMIN — SODIUM CHLORIDE 1000 ML: 0.9 INJECTION, SOLUTION INTRAVENOUS at 09:12

## 2021-12-20 ENCOUNTER — TELEPHONE (OUTPATIENT)
Dept: FAMILY MEDICINE | Facility: CLINIC | Age: 36
End: 2021-12-20
Payer: MEDICAID

## 2022-01-27 ENCOUNTER — TELEPHONE (OUTPATIENT)
Dept: FAMILY MEDICINE | Facility: CLINIC | Age: 37
End: 2022-01-27
Payer: MEDICAID

## 2022-01-27 ENCOUNTER — OFFICE VISIT (OUTPATIENT)
Dept: FAMILY MEDICINE | Facility: CLINIC | Age: 37
End: 2022-01-27
Payer: MEDICAID

## 2022-01-27 VITALS
SYSTOLIC BLOOD PRESSURE: 126 MMHG | BODY MASS INDEX: 35.54 KG/M2 | DIASTOLIC BLOOD PRESSURE: 82 MMHG | HEIGHT: 61 IN | TEMPERATURE: 98 F | WEIGHT: 188.25 LBS

## 2022-01-27 DIAGNOSIS — M54.9 BACK PAIN, UNSPECIFIED BACK LOCATION, UNSPECIFIED BACK PAIN LATERALITY, UNSPECIFIED CHRONICITY: Primary | ICD-10-CM

## 2022-01-27 DIAGNOSIS — I50.9 CONGESTIVE HEART FAILURE, UNSPECIFIED HF CHRONICITY, UNSPECIFIED HEART FAILURE TYPE: ICD-10-CM

## 2022-01-27 DIAGNOSIS — J30.1 SEASONAL ALLERGIC RHINITIS DUE TO POLLEN: ICD-10-CM

## 2022-01-27 DIAGNOSIS — I10 ESSENTIAL HYPERTENSION: ICD-10-CM

## 2022-01-27 DIAGNOSIS — I10 HYPERTENSION, UNSPECIFIED TYPE: ICD-10-CM

## 2022-01-27 PROCEDURE — 1159F MED LIST DOCD IN RCRD: CPT | Mod: CPTII,S$GLB,, | Performed by: FAMILY MEDICINE

## 2022-01-27 PROCEDURE — 4010F PR ACE/ARB THEARPY RXD/TAKEN: ICD-10-PCS | Mod: CPTII,S$GLB,, | Performed by: FAMILY MEDICINE

## 2022-01-27 PROCEDURE — 3079F DIAST BP 80-89 MM HG: CPT | Mod: CPTII,S$GLB,, | Performed by: FAMILY MEDICINE

## 2022-01-27 PROCEDURE — 4010F ACE/ARB THERAPY RXD/TAKEN: CPT | Mod: CPTII,S$GLB,, | Performed by: FAMILY MEDICINE

## 2022-01-27 PROCEDURE — 1159F PR MEDICATION LIST DOCUMENTED IN MEDICAL RECORD: ICD-10-PCS | Mod: CPTII,S$GLB,, | Performed by: FAMILY MEDICINE

## 2022-01-27 PROCEDURE — 3008F PR BODY MASS INDEX (BMI) DOCUMENTED: ICD-10-PCS | Mod: CPTII,S$GLB,, | Performed by: FAMILY MEDICINE

## 2022-01-27 PROCEDURE — 3079F PR MOST RECENT DIASTOLIC BLOOD PRESSURE 80-89 MM HG: ICD-10-PCS | Mod: CPTII,S$GLB,, | Performed by: FAMILY MEDICINE

## 2022-01-27 PROCEDURE — 3074F PR MOST RECENT SYSTOLIC BLOOD PRESSURE < 130 MM HG: ICD-10-PCS | Mod: CPTII,S$GLB,, | Performed by: FAMILY MEDICINE

## 2022-01-27 PROCEDURE — 99214 PR OFFICE/OUTPT VISIT, EST, LEVL IV, 30-39 MIN: ICD-10-PCS | Mod: S$GLB,,, | Performed by: FAMILY MEDICINE

## 2022-01-27 PROCEDURE — 3074F SYST BP LT 130 MM HG: CPT | Mod: CPTII,S$GLB,, | Performed by: FAMILY MEDICINE

## 2022-01-27 PROCEDURE — 3008F BODY MASS INDEX DOCD: CPT | Mod: CPTII,S$GLB,, | Performed by: FAMILY MEDICINE

## 2022-01-27 PROCEDURE — 99214 OFFICE O/P EST MOD 30 MIN: CPT | Mod: S$GLB,,, | Performed by: FAMILY MEDICINE

## 2022-01-27 RX ORDER — NAPROXEN 375 MG/1
375 TABLET ORAL 2 TIMES DAILY WITH MEALS
Qty: 30 TABLET | Refills: 0 | Status: CANCELLED | OUTPATIENT
Start: 2022-01-27

## 2022-01-27 RX ORDER — PHENTERMINE HYDROCHLORIDE 37.5 MG/1
37.5 TABLET ORAL
Status: CANCELLED | OUTPATIENT
Start: 2022-01-27

## 2022-01-27 RX ORDER — NAPROXEN 500 MG/1
500 TABLET ORAL 2 TIMES DAILY WITH MEALS
Qty: 60 TABLET | Refills: 3 | OUTPATIENT
Start: 2022-01-27 | End: 2022-02-20

## 2022-01-27 RX ORDER — FUROSEMIDE 80 MG/1
80 TABLET ORAL DAILY
Qty: 90 TABLET | Refills: 3 | Status: SHIPPED | OUTPATIENT
Start: 2022-01-27 | End: 2023-03-02

## 2022-01-27 RX ORDER — IBUPROFEN 800 MG/1
800 TABLET ORAL 3 TIMES DAILY PRN
Qty: 30 TABLET | Refills: 0 | Status: CANCELLED | OUTPATIENT
Start: 2022-01-27

## 2022-01-27 RX ORDER — PROMETHAZINE HYDROCHLORIDE 25 MG/1
25-50 TABLET ORAL EVERY 8 HOURS PRN
COMMUNITY
Start: 2022-01-26 | End: 2023-09-05

## 2022-01-27 RX ORDER — SPIRONOLACTONE 50 MG/1
50 TABLET, FILM COATED ORAL DAILY
Qty: 90 TABLET | Refills: 3 | Status: SHIPPED | OUTPATIENT
Start: 2022-01-27 | End: 2023-03-02

## 2022-01-27 RX ORDER — POTASSIUM CHLORIDE 20 MEQ/1
20 TABLET, EXTENDED RELEASE ORAL DAILY
Qty: 90 TABLET | Refills: 3 | Status: SHIPPED | OUTPATIENT
Start: 2022-01-27

## 2022-01-27 RX ORDER — METOPROLOL SUCCINATE 50 MG/1
50 TABLET, EXTENDED RELEASE ORAL DAILY
Qty: 90 TABLET | Refills: 3 | Status: SHIPPED | OUTPATIENT
Start: 2022-01-27 | End: 2023-03-02

## 2022-01-27 RX ORDER — LOSARTAN POTASSIUM 50 MG/1
50 TABLET ORAL DAILY
Qty: 90 TABLET | Refills: 3 | Status: SHIPPED | OUTPATIENT
Start: 2022-01-27

## 2022-01-27 RX ORDER — CEPHALEXIN 500 MG/1
500 CAPSULE ORAL 3 TIMES DAILY
COMMUNITY
Start: 2022-01-26

## 2022-01-27 RX ORDER — OXYCODONE AND ACETAMINOPHEN 10; 325 MG/1; MG/1
1 TABLET ORAL 3 TIMES DAILY PRN
COMMUNITY
Start: 2022-01-26

## 2022-01-27 RX ORDER — DOCUSATE SODIUM 100 MG/1
100 CAPSULE, LIQUID FILLED ORAL 2 TIMES DAILY
Qty: 60 CAPSULE | Refills: 0 | Status: SHIPPED | OUTPATIENT
Start: 2022-01-27

## 2022-01-27 RX ORDER — LORATADINE 10 MG/1
10 TABLET ORAL DAILY
Qty: 90 TABLET | Refills: 3 | Status: SHIPPED | OUTPATIENT
Start: 2022-01-27 | End: 2023-11-16 | Stop reason: ALTCHOICE

## 2022-01-27 RX ORDER — CYCLOBENZAPRINE HCL 10 MG
10 TABLET ORAL 3 TIMES DAILY
COMMUNITY
Start: 2022-01-26 | End: 2023-11-16 | Stop reason: ALTCHOICE

## 2022-01-27 NOTE — TELEPHONE ENCOUNTER
----- Message from Laure Arnold sent at 1/27/2022 12:18 PM CST -----  Regarding: call back  Contact: [phone]  Who Called: PT     Patient is calling to see if she can still be seen since she forgot that patient had an appointment.

## 2022-01-27 NOTE — PROGRESS NOTES
Subjective:       Patient ID: Anne Rangel is a 36 y.o. female.    Chief Complaint: Follow-up and Medication Refill    CHF:  Had a single episode of post partum CHF 17 years ago.  No further exacerbations since.     Scheduled for breast reduction surgery tomorrow.  Breasts are heavy and causing upper back pain.     Hypertension  This is a chronic problem. The current episode started more than 1 year ago. The problem is unchanged. The problem is controlled. Pertinent negatives include no anxiety, blurred vision, malaise/fatigue, orthopnea, palpitations, peripheral edema, PND, shortness of breath or sweats. There are no associated agents to hypertension. Risk factors for coronary artery disease include obesity. Past treatments include angiotensin blockers, beta blockers and diuretics. The current treatment provides significant improvement.     Review of Systems   Constitutional: Negative for activity change, appetite change and malaise/fatigue.   HENT: Negative for ear discharge, ear pain, rhinorrhea and trouble swallowing.    Eyes: Negative for blurred vision, photophobia, pain, redness, itching and visual disturbance.   Respiratory: Negative for chest tightness, shortness of breath and wheezing.    Cardiovascular: Negative for palpitations, orthopnea, leg swelling and PND.   Gastrointestinal: Negative for abdominal distention, blood in stool and diarrhea.   Genitourinary: Negative for vaginal bleeding, vaginal discharge and vaginal pain.   Musculoskeletal: Negative for gait problem.   Integumentary:  Negative for color change and pallor.   Neurological: Negative for dizziness, tremors and light-headedness.   Psychiatric/Behavioral: Negative for agitation, behavioral problems, confusion and sleep disturbance.         Past Medical History:   Diagnosis Date    Asthma     CHF (congestive heart failure)     Hypertension     Stroke      Social History     Socioeconomic History    Marital status: Single  "  Tobacco Use    Smoking status: Never Smoker    Smokeless tobacco: Never Used   Substance and Sexual Activity    Alcohol use: Yes     Comment: occasionally    Drug use: No    Sexual activity: Yes     Partners: Male     Birth control/protection: See Surgical Hx       Objective:     /82 (BP Location: Right arm, Patient Position: Sitting, BP Method: Medium (Manual))   Temp 98.4 °F (36.9 °C) (Oral)   Ht 5' 1" (1.549 m)   Wt 85.4 kg (188 lb 4.4 oz)   LMP 01/05/2022 (Approximate)   BMI 35.57 kg/m²     Physical Exam  Constitutional:       Appearance: She is well-developed.   HENT:      Head: Normocephalic.   Eyes:      Conjunctiva/sclera: Conjunctivae normal.   Cardiovascular:      Rate and Rhythm: Normal rate and regular rhythm.      Heart sounds: Normal heart sounds.   Pulmonary:      Effort: Pulmonary effort is normal.      Breath sounds: Normal breath sounds.   Musculoskeletal:      Cervical back: Normal range of motion and neck supple.   Skin:     General: Skin is warm and dry.   Neurological:      Mental Status: She is alert.   Psychiatric:         Behavior: Behavior normal.         Assessment:       1. Back pain, unspecified back location, unspecified back pain laterality, unspecified chronicity    2. Congestive heart failure, unspecified HF chronicity, unspecified heart failure type    3. Seasonal allergic rhinitis due to pollen    4. Hypertension, unspecified type    5. Essential hypertension        Plan:       Back pain, unspecified back location, unspecified back pain laterality, unspecified chronicity    Congestive heart failure, unspecified HF chronicity, unspecified heart failure type  -     furosemide (LASIX) 80 MG tablet; Take 1 tablet (80 mg total) by mouth once daily.  Dispense: 90 tablet; Refill: 3  -     metoprolol succinate (TOPROL-XL) 50 MG 24 hr tablet; Take 1 tablet (50 mg total) by mouth once daily.  Dispense: 90 tablet; Refill: 3  -     potassium chloride SA (K-DUR,KLOR-CON) 20 " MEQ tablet; Take 1 tablet (20 mEq total) by mouth once daily.  Dispense: 90 tablet; Refill: 3  -     spironolactone (ALDACTONE) 50 MG tablet; Take 1 tablet (50 mg total) by mouth once daily.  Dispense: 90 tablet; Refill: 3    Seasonal allergic rhinitis due to pollen  -     loratadine (CLARITIN) 10 mg tablet; Take 1 tablet (10 mg total) by mouth once daily.  Dispense: 90 tablet; Refill: 3    Hypertension, unspecified type  -     losartan (COZAAR) 50 MG tablet; Take 1 tablet (50 mg total) by mouth once daily.  Dispense: 90 tablet; Refill: 3    Essential hypertension    Other orders  -     docusate sodium (COLACE) 100 MG capsule; Take 1 capsule (100 mg total) by mouth 2 (two) times daily.  Dispense: 60 capsule; Refill: 0  -     naproxen (NAPROSYN) 500 MG tablet; Take 1 tablet (500 mg total) by mouth 2 (two) times daily with meals.  Dispense: 60 tablet; Refill: 3

## 2022-02-11 ENCOUNTER — HOSPITAL ENCOUNTER (EMERGENCY)
Facility: HOSPITAL | Age: 37
Discharge: HOME | End: 2022-02-11
Attending: EMERGENCY MEDICINE
Payer: MEDICAID

## 2022-02-11 VITALS
HEART RATE: 87 BPM | RESPIRATION RATE: 18 BRPM | DIASTOLIC BLOOD PRESSURE: 93 MMHG | WEIGHT: 179 LBS | HEIGHT: 61 IN | SYSTOLIC BLOOD PRESSURE: 145 MMHG | BODY MASS INDEX: 33.79 KG/M2 | OXYGEN SATURATION: 99 % | TEMPERATURE: 98 F

## 2022-02-11 DIAGNOSIS — T50.901A ACCIDENTAL DRUG INGESTION, INITIAL ENCOUNTER: Primary | ICD-10-CM

## 2022-02-11 PROCEDURE — 99283 EMERGENCY DEPT VISIT LOW MDM: CPT | Mod: ER

## 2022-02-11 PROCEDURE — 25000003 PHARM REV CODE 250: Mod: ER | Performed by: EMERGENCY MEDICINE

## 2022-02-11 RX ORDER — ONDANSETRON 4 MG/1
4 TABLET, ORALLY DISINTEGRATING ORAL
Status: COMPLETED | OUTPATIENT
Start: 2022-02-11 | End: 2022-02-11

## 2022-02-11 RX ADMIN — ONDANSETRON 4 MG: 4 TABLET, ORALLY DISINTEGRATING ORAL at 01:02

## 2022-02-12 NOTE — ED PROVIDER NOTES
"Encounter Date: 2/11/2022       History     Chief Complaint   Patient presents with    Ingestion     Pt reports she had a vaginal odor that started today. She states she bought and took AZO about 30 mins PTA. Pt states she felt her stomach burning and read the bottle. It states "suppository - boric acid"     The patient currently isn't with concern regarding accidental drug ingestion.  She notes that she tended to take oral azo tablets but inadvertently took two 600 mg vaginal suppositories which contain boric acid.  Patient notes some nausea and stomach irritation but otherwise is without symptoms.  Ingestion was noted a short time before arrival.        Review of patient's allergies indicates:  No Known Allergies  Past Medical History:   Diagnosis Date    Asthma     CHF (congestive heart failure)     Hypertension     Stroke      Past Surgical History:   Procedure Laterality Date    MYOMECTOMY      TUBAL LIGATION       Family History   Problem Relation Age of Onset    Ovarian cancer Maternal Grandmother     Breast cancer Neg Hx     Colon cancer Neg Hx      Social History     Tobacco Use    Smoking status: Never Smoker    Smokeless tobacco: Never Used   Substance Use Topics    Alcohol use: Yes     Comment: occasionally    Drug use: No     Review of Systems   Constitutional: Negative for chills and fever.   HENT: Negative for congestion and rhinorrhea.    Respiratory: Negative for cough and shortness of breath.    Cardiovascular: Negative for chest pain and palpitations.   Gastrointestinal: Positive for nausea. Negative for abdominal pain, diarrhea and vomiting.   Genitourinary: Negative for dysuria.   Skin: Negative for color change and rash.   Allergic/Immunologic: Negative for immunocompromised state.   Neurological: Negative for dizziness and light-headedness.   Hematological: Negative for adenopathy. Does not bruise/bleed easily.     Physical Exam     Initial Vitals [02/11/22 0022]   BP Pulse Resp " Temp SpO2   (!) 145/93 87 18 98.1 °F (36.7 °C) 99 %      MAP       --         Physical Exam    Nursing note and vitals reviewed.  Constitutional: She appears well-developed and well-nourished. She is not diaphoretic. No distress.   HENT:   Head: Normocephalic and atraumatic.   Nose: Nose normal.   Mouth/Throat: Oropharynx is clear and moist.   Eyes: Conjunctivae are normal. No scleral icterus.   Cardiovascular: Normal rate, regular rhythm, normal heart sounds and intact distal pulses.   Pulmonary/Chest: Breath sounds normal. No respiratory distress.   Abdominal: Abdomen is soft. She exhibits no distension. There is no abdominal tenderness.     Neurological: She is alert and oriented to person, place, and time. She has normal strength.   Skin: Skin is warm and dry.         ED Course   Procedures  Labs Reviewed - No data to display       Imaging Results    None          Medications   ondansetron disintegrating tablet 4 mg (4 mg Oral Given 2/11/22 0100)     Medical Decision Making:   ED Management:  Poison control was consulted regarding this ingestion.  The indicate that this dosing should not result in any substantial horn though the patient may experience some GI upset.  All findings were reviewed with the patient/family in detail.  I see no indication of an emergent process beyond that addressed during our encounter but have duly counseled the patient/family regarding the need for prompt follow-up as well as the indications that should prompt immediate return to the emergency room should new or worrisome developments occur.  The patient has additionally been provided with printed information regarding diagnosis as well as instructions regarding follow up and any medications intended to manage the patient's aforementioned conditions.  The patient/family communicates understanding of all this information and all remaining questions and concerns were addressed at this time.                            Clinical  Impression:   Final diagnoses:  [T50.901A] Accidental drug ingestion, initial encounter (Primary)          ED Disposition Condition    Discharge Stable        ED Prescriptions     None        Follow-up Information     Follow up With Specialties Details Why Contact Info    Leana Feliz, DO Family Medicine Schedule an appointment as soon as possible for a visit  for reassessment [address] [address] [phone]      [facility] - Emergency Dept Emergency Medicine Go to  As needed, If symptoms worsen [address] [facility] [id number] [phone]           Niko Reddy MD  02/11/22 9549

## 2022-02-20 ENCOUNTER — HOSPITAL ENCOUNTER (EMERGENCY)
Facility: HOSPITAL | Age: 37
Discharge: HOME | End: 2022-02-20
Attending: FAMILY MEDICINE
Payer: MEDICAID

## 2022-02-20 VITALS
SYSTOLIC BLOOD PRESSURE: 132 MMHG | BODY MASS INDEX: 32.1 KG/M2 | OXYGEN SATURATION: 97 % | HEART RATE: 101 BPM | DIASTOLIC BLOOD PRESSURE: 83 MMHG | WEIGHT: 170 LBS | TEMPERATURE: 98 F | HEIGHT: 61 IN | RESPIRATION RATE: 16 BRPM
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
## 2022-02-20 DIAGNOSIS — M79.604 RIGHT LEG PAIN: Primary | ICD-10-CM

## 2022-02-20 PROCEDURE — 99283 EMERGENCY DEPT VISIT LOW MDM: CPT | Mod: 25,ER

## 2022-02-20 PROCEDURE — 25000003 PHARM REV CODE 250: Mod: ER | Performed by: PHYSICIAN ASSISTANT

## 2022-02-20 RX ORDER — NAPROXEN 500 MG/1
500 TABLET ORAL 2 TIMES DAILY WITH MEALS
Qty: 14 TABLET | Refills: 0 | Status: SHIPPED | OUTPATIENT
Start: 2022-02-20

## 2022-02-20 RX ORDER — ACETAMINOPHEN 500 MG
1000 TABLET ORAL
Status: COMPLETED | OUTPATIENT
Start: 2022-02-20 | End: 2022-02-20

## 2022-02-20 RX ADMIN — ACETAMINOPHEN 1000 MG: 500 TABLET ORAL at 08:02

## 2022-02-21 NOTE — ED PROVIDER NOTES
Encounter Date: 2/20/2022       History     Chief Complaint   Patient presents with    Leg Pain    Fall     Pt states she fell of parade float step landing on right leg Friday, pt states she can hardly put weight on right leg. Pt denies any LOC pt denies any other injury at this time, pt states she missed step walking when she fell, pt c/o right lower leg pain, pt states her foot does not hurt just her leg.  Pt states pain worse if she turns leg.      HPI: Anne Rangel, a 36 y.o. female  has a past medical history of Asthma, CHF (congestive heart failure), Hypertension, and Stroke.     She presents to the ED for evaluation of right lower leg pain after she missed a step coming off of a float on Friday night.  Attest to pain with ambulation.  Denies any bruising.  States that she is taking Tylenol and ibuprofen at home with little improvement of her symptoms.  Denies previous history of fracture or surgery to site.      The history is provided by the patient.     Review of patient's allergies indicates:  No Known Allergies  Past Medical History:   Diagnosis Date    Asthma     CHF (congestive heart failure)     Hypertension     Stroke      Past Surgical History:   Procedure Laterality Date    MYOMECTOMY      TUBAL LIGATION       Family History   Problem Relation Age of Onset    Ovarian cancer Maternal Grandmother     Breast cancer Neg Hx     Colon cancer Neg Hx      Social History     Tobacco Use    Smoking status: Never Smoker    Smokeless tobacco: Never Used   Substance Use Topics    Alcohol use: Yes     Comment: occasionally    Drug use: No     Review of Systems   Constitutional: Negative for fever.   Gastrointestinal: Negative for nausea and vomiting.   Musculoskeletal: Positive for arthralgias. Negative for myalgias and neck stiffness.   Allergic/Immunologic: Negative for immunocompromised state.   Neurological: Negative for weakness and numbness.   Psychiatric/Behavioral: Negative for  agitation.   All other systems reviewed and are negative.      Physical Exam     Initial Vitals [02/20/22 1914]   BP Pulse Resp Temp SpO2   132/83 101 16 98.1 °F (36.7 °C) 97 %      MAP       --         Physical Exam    Nursing note and vitals reviewed.  Constitutional: She appears well-developed and well-nourished. She is not diaphoretic. No distress.   HENT:   Head: Normocephalic and atraumatic.   Right Ear: External ear normal.   Left Ear: External ear normal.   Nose: Nose normal.   Eyes: Conjunctivae and EOM are normal.   Neck:   Normal range of motion.  Cardiovascular: Normal rate and regular rhythm.   Pulses:       Dorsalis pedis pulses are 2+ on the right side and 2+ on the left side.   Pulmonary/Chest: No respiratory distress.   Musculoskeletal:         General: Normal range of motion.      Cervical back: Normal range of motion.      Right ankle: No swelling, deformity, ecchymosis or lacerations. Tenderness present over the lateral malleolus. No base of 5th metatarsal or proximal fibula tenderness. Normal range of motion. Anterior drawer test negative. Normal pulse.        Legs:      Neurological: She is alert and oriented to person, place, and time.   Skin: Capillary refill takes less than 2 seconds. No rash noted.   Psychiatric: She has a normal mood and affect. Thought content normal.         ED Course   Procedures  Labs Reviewed - No data to display       Imaging Results          X-Ray Ankle Complete Right (Final result)  Result time 02/20/22 20:31:44    Final result by Chloe Childs MD (02/20/22 20:31:44)                 Impression:      No acute fracture or dislocation      Electronically signed by: Amadeo Corona  Date:    02/20/2022  Time:    20:31             Narrative:    EXAMINATION:  XR ANKLE COMPLETE 3 VIEW RIGHT    CLINICAL HISTORY:  Unspecified fall, initial encounter    TECHNIQUE:  AP, lateral, and oblique images of the right ankle were performed.    COMPARISON:  None    FINDINGS:  No acute  fracture or dislocation.  Normal joint spaces.  Slightly prominent soft tissues..                               X-Ray Tibia Fibula 2 View Right (Final result)  Result time 02/20/22 20:32:28    Final result by Chloe Childs MD (02/20/22 20:32:28)                 Impression:      No acute abnormality.      Electronically signed by: Amadeo Corona  Date:    02/20/2022  Time:    20:32             Narrative:    EXAMINATION:  XR TIBIA FIBULA 2 VIEW RIGHT    CLINICAL HISTORY:  XR TIBIA FIBULA 2 VIEW RIGHTUnspecified fall, initial encounter    COMPARISON:  None    FINDINGS:  Multiple radiographic views  were obtained.    No evidence of acute fracture or dislocation.  Bony mineralization is normal.  Soft tissues are unremarkable.                                 Medications - No data to display  Medical Decision Making:   Initial Assessment:   Fall with right ankle pain   Differential Diagnosis:   Fracture, dislocation, contusion   Clinical Tests:   Radiological Study: Ordered and Reviewed  ED Management:  Pt presents to ED for evaluation of right ankle/lower leg pain after fall.  Ambulatory.  NVI.  X-ray shows no acute abnormality.  Patient was given information on RICE protocol and reasons for return.  Instructed of the need for repeat x-ray in the event of no improvement of her pain in 1 week.  She verbalized understanding agreement plan.                      Clinical Impression:   Final diagnoses:  [W19.XXXA] Fall  [M79.604] Right leg pain (Primary)                 Phyllis Hobbs PA-C  02/20/22 [id number]

## 2022-06-28 NOTE — ED NOTES
No distress noted upon discharge; gait steady and resp even and unlabored   Bactrim Counseling:  I discussed with the patient the risks of sulfa antibiotics including but not limited to GI upset, allergic reaction, drug rash, diarrhea, dizziness, photosensitivity, and yeast infections.  Rarely, more serious reactions can occur including but not limited to aplastic anemia, agranulocytosis, methemoglobinemia, blood dyscrasias, liver or kidney failure, lung infiltrates or desquamative/blistering drug rashes.

## 2022-10-04 NOTE — PATIENT INSTRUCTIONS
What Are Fibroids?  Fibroids are growths made up of connective tissue and muscle cells that usually form in the wall of your uterus. Other names for fibroids are myomas and leiomyomas. Fibroids are the most common tumor in women. They are almost always noncancerous (benign) and harmless. Fibroids start as pea-sized lumps, but can grow steadily during your reproductive years. Many fibroids just need to be watched. Others may need treatment if they become too large or cause symptoms.    Potential problems  Fibroids often cause no symptoms. But a fibroid that grows quickly in your uterus can cause 1 or more of the following problems:  · Excessive uterine bleeding, leading to anemia (lack of red blood cells)  · Frequent urge to urinate  · Difficulty having bowel movements  · Achiness, heaviness, or fullness  · Back or abdominal pain  · Pain during intercourse  · Difficulty getting pregnant or being unable to get pregnant  · Problems with pregnancy  · Enlargement of the lower abdomen  Treatment is tailored for you  No 2 fibroids are the same. The type of treatment you will have depends on their number, size, location, and rate of growth. Your treatment decision also depends on the severity of your symptoms and whether or not you plan to have children in the future. There are a growing number of effective ways to treat fibroids. After your medical evaluation, your health care provider will be able to discuss with you the best options to solve your particular problem and meet your needs.  Your future checkups  Treating your fibroids is likely to relieve your symptoms. But your health care provider will want to check your progress. Ask your health care provider about any additional follow-up visits you might need.   Date Last Reviewed: 5/10/2015  © 9366-7387 [employer]. [address] [place] [address], [place] [address]. All rights reserved. This information is not intended as a substitute for professional medical  care. Always follow your healthcare professional's instructions.         Unable

## 2022-12-07 ENCOUNTER — TELEPHONE (OUTPATIENT)
Dept: FAMILY MEDICINE | Facility: CLINIC | Age: 37
End: 2022-12-07
Payer: MEDICAID

## 2022-12-07 NOTE — TELEPHONE ENCOUNTER
----- Message from Kitty Alvarado sent at 12/7/2022 12:32 PM CST -----  Type:  Patient Returning Call    Who Called:Pt     Would the patient rather a call back or a response via [facility]? Call back   Best Call Back Number:[phone]  Additional Information: needing to be seen for her allergies .. was seeing Dr Feliz       I spoke with the pt   There was a gas link at a concert  In [place]  Went to ER  Cant stop coughing since this occurred  X 2 weeks ago  Dry cough   Occasional tickle in the throat    Tried amoxicillin  And otc -claritin   Night sweats     Patient has medicaid - unable to schedule     Please advise

## 2022-12-09 NOTE — TELEPHONE ENCOUNTER
Spoke to patient. Patient went back to  and was given cough meds, abx, albuterol and a medrol dose pack. Patient will try the Flonase. I advised to use saline first to help clean out her nostril before using the flonase to help the medication soak in better and to tilt the applicator toward her sinuses for better relief.    Patient states that she has been gaining excessive weight and would like to come in for weight loss medication.

## 2022-12-12 NOTE — TELEPHONE ENCOUNTER
Spoke to patient. Patient is still having a hard time sleeping at night as her cough is waking her up throughout the night.     Patient is asking about sending a stronger cough medicine for night time relief.    Patient accepted the next available appointment for weight loss and I added her to the waitlist.

## 2023-02-10 ENCOUNTER — OFFICE VISIT (OUTPATIENT)
Dept: OBSTETRICS AND GYNECOLOGY | Facility: CLINIC | Age: 38
End: 2023-02-10
Payer: MEDICAID

## 2023-02-10 VITALS
SYSTOLIC BLOOD PRESSURE: 120 MMHG | BODY MASS INDEX: 35.41 KG/M2 | DIASTOLIC BLOOD PRESSURE: 79 MMHG | WEIGHT: 187.38 LBS

## 2023-02-10 DIAGNOSIS — N64.4 BREAST PAIN, RIGHT: ICD-10-CM

## 2023-02-10 DIAGNOSIS — Z01.419 WELL WOMAN EXAM WITH ROUTINE GYNECOLOGICAL EXAM: Primary | ICD-10-CM

## 2023-02-10 DIAGNOSIS — D21.9 FIBROID: ICD-10-CM

## 2023-02-10 DIAGNOSIS — Z12.4 SCREENING FOR CERVICAL CANCER: ICD-10-CM

## 2023-02-10 PROCEDURE — 3074F SYST BP LT 130 MM HG: CPT | Mod: CPTII,,, | Performed by: OBSTETRICS & GYNECOLOGY

## 2023-02-10 PROCEDURE — 1160F PR REVIEW ALL MEDS BY PRESCRIBER/CLIN PHARMACIST DOCUMENTED: ICD-10-PCS | Mod: CPTII,,, | Performed by: OBSTETRICS & GYNECOLOGY

## 2023-02-10 PROCEDURE — 3078F DIAST BP <80 MM HG: CPT | Mod: CPTII,,, | Performed by: OBSTETRICS & GYNECOLOGY

## 2023-02-10 PROCEDURE — 99395 PREV VISIT EST AGE 18-39: CPT | Mod: S$PBB,,, | Performed by: OBSTETRICS & GYNECOLOGY

## 2023-02-10 PROCEDURE — 3074F PR MOST RECENT SYSTOLIC BLOOD PRESSURE < 130 MM HG: ICD-10-PCS | Mod: CPTII,,, | Performed by: OBSTETRICS & GYNECOLOGY

## 2023-02-10 PROCEDURE — 99999 PR PBB SHADOW E&M-EST. PATIENT-LVL IV: ICD-10-PCS | Mod: PBBFAC,,, | Performed by: OBSTETRICS & GYNECOLOGY

## 2023-02-10 PROCEDURE — 3008F PR BODY MASS INDEX (BMI) DOCUMENTED: ICD-10-PCS | Mod: CPTII,,, | Performed by: OBSTETRICS & GYNECOLOGY

## 2023-02-10 PROCEDURE — 1159F PR MEDICATION LIST DOCUMENTED IN MEDICAL RECORD: ICD-10-PCS | Mod: CPTII,,, | Performed by: OBSTETRICS & GYNECOLOGY

## 2023-02-10 PROCEDURE — 99999 PR PBB SHADOW E&M-EST. PATIENT-LVL IV: CPT | Mod: PBBFAC,,, | Performed by: OBSTETRICS & GYNECOLOGY

## 2023-02-10 PROCEDURE — 3008F BODY MASS INDEX DOCD: CPT | Mod: CPTII,,, | Performed by: OBSTETRICS & GYNECOLOGY

## 2023-02-10 PROCEDURE — 99214 OFFICE O/P EST MOD 30 MIN: CPT | Mod: PBBFAC,PO | Performed by: OBSTETRICS & GYNECOLOGY

## 2023-02-10 PROCEDURE — 81514 NFCT DS BV&VAGINITIS DNA ALG: CPT | Performed by: OBSTETRICS & GYNECOLOGY

## 2023-02-10 PROCEDURE — 1160F RVW MEDS BY RX/DR IN RCRD: CPT | Mod: CPTII,,, | Performed by: OBSTETRICS & GYNECOLOGY

## 2023-02-10 PROCEDURE — 4010F ACE/ARB THERAPY RXD/TAKEN: CPT | Mod: CPTII,,, | Performed by: OBSTETRICS & GYNECOLOGY

## 2023-02-10 PROCEDURE — 1159F MED LIST DOCD IN RCRD: CPT | Mod: CPTII,,, | Performed by: OBSTETRICS & GYNECOLOGY

## 2023-02-10 PROCEDURE — 88175 CYTOPATH C/V AUTO FLUID REDO: CPT | Performed by: OBSTETRICS & GYNECOLOGY

## 2023-02-10 PROCEDURE — 3078F PR MOST RECENT DIASTOLIC BLOOD PRESSURE < 80 MM HG: ICD-10-PCS | Mod: CPTII,,, | Performed by: OBSTETRICS & GYNECOLOGY

## 2023-02-10 PROCEDURE — 99395 PR PREVENTIVE VISIT,EST,18-39: ICD-10-PCS | Mod: S$PBB,,, | Performed by: OBSTETRICS & GYNECOLOGY

## 2023-02-10 PROCEDURE — 4010F PR ACE/ARB THEARPY RXD/TAKEN: ICD-10-PCS | Mod: CPTII,,, | Performed by: OBSTETRICS & GYNECOLOGY

## 2023-02-10 NOTE — PROGRESS NOTES
GYNECOLOGY OFFICE NOTE    Reason for visit: annual    HPI: Pt is a 37 y.o.  female  who presents for annual. Menarche: 10. Cycle: Interval-  Q month, Duration- 3-4 days, Flow- heavy (changing Q 30min -1 hr for the first 2 days ), denies dysmenorrhea. She is sexually active.  She uses bilateral tubal ligation for contraception.  She does not desire STI screening. She reports vaginal discharge.  Last pap: 2020, denies hx of abnormal. Reports right breast lump x 6-7 months. Denies nipple discharge.    Pelvic US:  The uterus measures 9.0 x 4.3 x 5.8cm.  There is a hypoechoic, exophytic lesion arising off the uterine fundus measuring 2.9 x 2.3 x 4.2 cm.  The endometrial thickness is 8mm.  There is no endometrial fluid. The right ovary measures 2.9 x 1.5 x 2.0 cm.  Appropriate ovarian flow.  No focal lesion.  The left ovary 3.5 x 1.5 x 2.9 cm.  Appropriate ovarian flow.  Small follicles are noted.  There is no free fluid in the visualized pelvis.  Impression:  Hyperdense focus visualized on CT represents an exophytic 4.2 cm fundal uterine fibroid.  No significant ovarian cyst.  Small follicles noted.    Past Medical History:   Diagnosis Date    Asthma     CHF (congestive heart failure)     Hypertension     Stroke        Past Surgical History:   Procedure Laterality Date    breast augmention      MYOMECTOMY      TUBAL LIGATION      tummy tu      lipo 360, BBL       Family History   Problem Relation Age of Onset    Ovarian cancer Maternal Grandmother     Breast cancer Neg Hx     Colon cancer Neg Hx        Social History     Tobacco Use    Smoking status: Never    Smokeless tobacco: Never   Substance Use Topics    Alcohol use: Yes     Comment: occasionally    Drug use: No       OB History    Para Term  AB Living   3 2     1 2   SAB IAB Ectopic Multiple Live Births     1     2      # Outcome Date GA Lbr Arnie/2nd Weight Sex Delivery Anes PTL Lv   3 IAB            2 Para      Vag-Spont    JAYSON   1 Para      Vag-Spont   JAYSON       Current Outpatient Medications   Medication Sig    aspirin 81 MG Chew Take 81 mg by mouth once daily.    cephALEXin (KEFLEX) 500 MG capsule Take 500 mg by mouth 3 (three) times daily.    cyclobenzaprine (FLEXERIL) 10 MG tablet Take 10 mg by mouth 3 (three) times daily.    docusate sodium (COLACE) 100 MG capsule Take 1 capsule (100 mg total) by mouth 2 (two) times daily.    furosemide (LASIX) 80 MG tablet Take 1 tablet (80 mg total) by mouth once daily.    HYDROcodone-acetaminophen (NORCO)  mg per tablet Take 1 tablet by mouth every 6 (six) hours as needed for Pain.    ibuprofen (ADVIL,MOTRIN) 800 MG tablet Take 1 tablet (800 mg total) by mouth 3 (three) times daily as needed.    losartan (COZAAR) 50 MG tablet Take 1 tablet (50 mg total) by mouth once daily.    lubiprostone (AMITIZA) 24 MCG Cap Take 1 capsule (24 mcg total) by mouth 2 (two) times daily with meals.    metoprolol succinate (TOPROL-XL) 50 MG 24 hr tablet Take 1 tablet (50 mg total) by mouth once daily.    naproxen (NAPROSYN) 500 MG tablet Take 1 tablet (500 mg total) by mouth 2 (two) times daily with meals.    ondansetron (ZOFRAN-ODT) 4 MG TbDL Take 1 tablet (4 mg total) by mouth every 6 (six) hours as needed (n/v).    oxyCODONE-acetaminophen (PERCOCET)  mg per tablet Take 1 tablet by mouth 3 (three) times daily as needed.    phentermine (ADIPEX-P) 37.5 mg tablet Take 1 tablet by mouth as needed.    potassium chloride SA (K-DUR,KLOR-CON) 20 MEQ tablet Take 1 tablet (20 mEq total) by mouth once daily.    promethazine (PHENERGAN) 25 MG tablet Take 25-50 mg by mouth every 8 (eight) hours as needed.    spironolactone (ALDACTONE) 50 MG tablet Take 1 tablet (50 mg total) by mouth once daily.    loratadine (CLARITIN) 10 mg tablet Take 1 tablet (10 mg total) by mouth once daily.     No current facility-administered medications for this visit.       Allergies: Patient has no known allergies.     /79   Wt  85 kg (187 lb 6.3 oz)   LMP 01/20/2023 (Approximate)   BMI 35.41 kg/m²     ROS:  GENERAL: Denies fever or chills.   SKIN: Denies rash or lesions.   HEAD: Denies head injury or headache.   CHEST: Denies chest pain or shortness of breath.   CARDIOVASCULAR: Denies palpitations or chest pain.   ABDOMEN: No constipation, diarrhea, nausea, vomiting or rectal bleeding.   URINARY: No dysuria, hematuria, or burning on urination.  REPRODUCTIVE: See HPI.   BREASTS: see HPI  NEUROLOGIC: Denies syncope or weakness.     Physical Exam:  GENERAL: alert, appears stated age and cooperative  NEUROLOGIC: orientated to person, place and time, normal mood and affect   CHEST: Normal respiratory effort  NECK: normal appearance  SKIN: no acne, hirsutism  BREAST EXAM: bilateral implants, no palpable abnormalities otherwise but tender to palpation just superior to scar on right  ABDOMEN: abdomen is soft without significant tenderness, masses  EXTERNAL GENITALIA:  normal general appearance  URETHRA: normal urethra, normal urethral meatus  VAGINA:  normal mucosa, no  lesions  CERVIX:  Normal  UTERUS:  mobile, non tender  ADNEXA: nontender    Diagnosis:  1. Well woman exam with routine gynecological exam    2. Fibroid    3. Screening for cervical cancer    4. Breast pain, right        Plan:   1. Annual  2. Pelvic US secondary to hx of uterine fibroids  3. Pap today  4. F/u imaging of R breast    Orders Placed This Encounter    Vaginosis Screen by DNA Probe    US Pelvis Comp with Transvag NON-OB (xpd)    US Breast Right Limited    Liquid-Based Pap Smear, Screening       Anya Nice MD  OB/GYN

## 2023-02-13 ENCOUNTER — HOSPITAL ENCOUNTER (OUTPATIENT)
Dept: RADIOLOGY | Facility: HOSPITAL | Age: 38
Discharge: HOME | End: 2023-02-13
Attending: OBSTETRICS & GYNECOLOGY
Payer: MEDICAID

## 2023-02-13 DIAGNOSIS — N64.4 BREAST PAIN, RIGHT: ICD-10-CM

## 2023-02-13 DIAGNOSIS — D21.9 FIBROID: ICD-10-CM

## 2023-02-13 PROCEDURE — 76856 US EXAM PELVIC COMPLETE: CPT | Mod: 26,,, | Performed by: RADIOLOGY

## 2023-02-13 PROCEDURE — 76856 US EXAM PELVIC COMPLETE: CPT | Mod: TC,PO

## 2023-02-13 PROCEDURE — 76856 US PELVIS COMPLETE NON OB: ICD-10-PCS | Mod: 26,,, | Performed by: RADIOLOGY

## 2023-02-14 LAB
BACTERIAL VAGINOSIS DNA: NEGATIVE
CANDIDA GLABRATA DNA: NEGATIVE
CANDIDA KRUSEI DNA: NEGATIVE
CANDIDA RRNA VAG QL PROBE: NEGATIVE
T VAGINALIS RRNA GENITAL QL PROBE: NEGATIVE

## 2023-02-17 LAB
FINAL PATHOLOGIC DIAGNOSIS: NORMAL
Lab: NORMAL

## 2023-03-02 DIAGNOSIS — I50.9 CONGESTIVE HEART FAILURE, UNSPECIFIED HF CHRONICITY, UNSPECIFIED HEART FAILURE TYPE: ICD-10-CM

## 2023-03-02 RX ORDER — SPIRONOLACTONE 50 MG/1
TABLET, FILM COATED ORAL
Qty: 90 TABLET | Refills: 0 | Status: SHIPPED | OUTPATIENT
Start: 2023-03-02

## 2023-03-02 RX ORDER — FUROSEMIDE 80 MG/1
TABLET ORAL
Qty: 90 TABLET | Refills: 0 | OUTPATIENT
Start: 2023-03-02 | End: 2023-07-11

## 2023-03-02 RX ORDER — METOPROLOL SUCCINATE 50 MG/1
TABLET, EXTENDED RELEASE ORAL
Qty: 90 TABLET | Refills: 0 | Status: SHIPPED | OUTPATIENT
Start: 2023-03-02

## 2023-03-22 ENCOUNTER — PATIENT OUTREACH (OUTPATIENT)
Dept: ADMINISTRATIVE | Facility: HOSPITAL | Age: 38
End: 2023-03-22
Payer: MEDICAID

## 2023-03-22 NOTE — PROGRESS NOTES
Care Everywhere updates requested and reviewed.  Immunizations reconciled. Media reports reviewed.  Duplicate HM overrides and  orders removed.  Overdue HM topic chart audit and/or requested.  Overdue lab testing linked to upcoming lab appointments if applies.    Health Maintenance Due   Topic Date Due    Hepatitis C Screening  Never done    HIV Screening  Never done    Hemoglobin A1c (Diabetic Prevention Screening)  Never done    COVID-19 Vaccine (3 - Booster for Moderna series) 10/10/2021    Influenza Vaccine (1) Never done

## 2023-04-05 ENCOUNTER — PATIENT OUTREACH (OUTPATIENT)
Dept: ADMINISTRATIVE | Facility: HOSPITAL | Age: 38
End: 2023-04-05
Payer: MEDICAID

## 2023-04-11 ENCOUNTER — PATIENT OUTREACH (OUTPATIENT)
Dept: ADMINISTRATIVE | Facility: OTHER | Age: 38
End: 2023-04-11
Payer: MEDICAID

## 2023-04-11 NOTE — PROGRESS NOTES
CHW - Initial Contact    This Community Health Worker completed the Social Determinant of Health questionnaire with MRN [id number] over the phone today.    Pt identified barriers of most importance are: None  Referrals to community agencies completed with patient/caregiver consent outside of [facility] include: No  Referrals were put through [facility] - No  Support and Services: No support & services have been documented.  Other information discussed the patient needs / wants help with: No show appointment outreach complete. Patient has been rescheduled with PCP for follow up.  Follow up required: No  No future outreach task assigned

## 2023-07-11 ENCOUNTER — HOSPITAL ENCOUNTER (EMERGENCY)
Facility: HOSPITAL | Age: 38
Discharge: HOME | End: 2023-07-11
Attending: EMERGENCY MEDICINE
Payer: MEDICAID

## 2023-07-11 VITALS
BODY MASS INDEX: 37.66 KG/M2 | TEMPERATURE: 98 F | DIASTOLIC BLOOD PRESSURE: 78 MMHG | RESPIRATION RATE: 20 BRPM | SYSTOLIC BLOOD PRESSURE: 125 MMHG | HEIGHT: 61 IN | HEART RATE: 69 BPM | OXYGEN SATURATION: 99 % | WEIGHT: 199.5 LBS

## 2023-07-11 DIAGNOSIS — I50.9 CHRONIC CONGESTIVE HEART FAILURE, UNSPECIFIED HEART FAILURE TYPE: Primary | ICD-10-CM

## 2023-07-11 DIAGNOSIS — R06.02 SHORTNESS OF BREATH: ICD-10-CM

## 2023-07-11 DIAGNOSIS — R60.9 EDEMA, UNSPECIFIED TYPE: ICD-10-CM

## 2023-07-11 LAB
ALBUMIN SERPL BCP-MCNC: 3.8 G/DL (ref 3.5–5.2)
ALP SERPL-CCNC: 51 U/L (ref 38–126)
ALT SERPL W/O P-5'-P-CCNC: 89 U/L (ref 10–44)
ANION GAP SERPL CALC-SCNC: 9 MMOL/L (ref 8–16)
AST SERPL-CCNC: 70 U/L (ref 15–46)
BASOPHILS # BLD AUTO: 0.04 K/UL (ref 0–0.2)
BASOPHILS NFR BLD: 0.6 % (ref 0–1.9)
BILIRUB SERPL-MCNC: 0.5 MG/DL (ref 0.1–1)
CALCIUM SERPL-MCNC: 9.9 MG/DL (ref 8.7–10.5)
CHLORIDE SERPL-SCNC: 105 MMOL/L (ref 95–110)
CO2 SERPL-SCNC: 27 MMOL/L (ref 23–29)
CREAT SERPL-MCNC: 0.65 MG/DL (ref 0.5–1.4)
DIFFERENTIAL METHOD: ABNORMAL
EOSINOPHIL # BLD AUTO: 0.1 K/UL (ref 0–0.5)
EOSINOPHIL NFR BLD: 1.5 % (ref 0–8)
ERYTHROCYTE [DISTWIDTH] IN BLOOD BY AUTOMATED COUNT: 14 % (ref 11.5–14.5)
EST. GFR  (NO RACE VARIABLE): >60 ML/MIN/1.73 M^2
GLUCOSE SERPL-MCNC: 96 MG/DL (ref 70–110)
HCT VFR BLD AUTO: 33.7 % (ref 37–48.5)
HGB BLD-MCNC: 11.1 G/DL (ref 12–16)
IMM GRANULOCYTES # BLD AUTO: 0.02 K/UL (ref 0–0.04)
IMM GRANULOCYTES NFR BLD AUTO: 0.3 % (ref 0–0.5)
LYMPHOCYTES # BLD AUTO: 2.9 K/UL (ref 1–4.8)
LYMPHOCYTES NFR BLD: 43.3 % (ref 18–48)
MCH RBC QN AUTO: 29.1 PG (ref 27–31)
MCHC RBC AUTO-ENTMCNC: 32.9 G/DL (ref 32–36)
MCV RBC AUTO: 89 FL (ref 82–98)
MONOCYTES # BLD AUTO: 0.4 K/UL (ref 0.3–1)
MONOCYTES NFR BLD: 6.7 % (ref 4–15)
NEUTROPHILS # BLD AUTO: 3.1 K/UL (ref 1.8–7.7)
NEUTROPHILS NFR BLD: 47.6 % (ref 38–73)
NRBC BLD-RTO: 0 /100 WBC
NT-PROBNP SERPL-MCNC: 507 PG/ML (ref 5–450)
PLATELET # BLD AUTO: 310 K/UL (ref 150–450)
PMV BLD AUTO: 12.1 FL (ref 9.2–12.9)
POTASSIUM SERPL-SCNC: 3.7 MMOL/L (ref 3.5–5.1)
PROT SERPL-MCNC: 7.1 G/DL (ref 6–8.4)
RBC # BLD AUTO: 3.81 M/UL (ref 4–5.4)
SODIUM SERPL-SCNC: 141 MMOL/L (ref 136–145)
TROPONIN I SERPL-MCNC: <0.012 NG/ML (ref 0.01–0.03)
UUN UR-MCNC: 5 MG/DL (ref 7–17)
WBC # BLD AUTO: 6.58 K/UL (ref 3.9–12.7)

## 2023-07-11 PROCEDURE — 93010 ELECTROCARDIOGRAM REPORT: CPT | Mod: ,,, | Performed by: INTERNAL MEDICINE

## 2023-07-11 PROCEDURE — 63600175 PHARM REV CODE 636 W HCPCS: Mod: ER | Performed by: PHYSICIAN ASSISTANT

## 2023-07-11 PROCEDURE — 85025 COMPLETE CBC W/AUTO DIFF WBC: CPT | Mod: ER | Performed by: PHYSICIAN ASSISTANT

## 2023-07-11 PROCEDURE — 96374 THER/PROPH/DIAG INJ IV PUSH: CPT | Mod: ER

## 2023-07-11 PROCEDURE — 93010 EKG 12-LEAD: ICD-10-PCS | Mod: ,,, | Performed by: INTERNAL MEDICINE

## 2023-07-11 PROCEDURE — 93005 ELECTROCARDIOGRAM TRACING: CPT | Mod: ER

## 2023-07-11 PROCEDURE — 83880 ASSAY OF NATRIURETIC PEPTIDE: CPT | Mod: ER | Performed by: PHYSICIAN ASSISTANT

## 2023-07-11 PROCEDURE — 80053 COMPREHEN METABOLIC PANEL: CPT | Mod: ER | Performed by: PHYSICIAN ASSISTANT

## 2023-07-11 PROCEDURE — 99285 EMERGENCY DEPT VISIT HI MDM: CPT | Mod: 25,ER

## 2023-07-11 PROCEDURE — 84484 ASSAY OF TROPONIN QUANT: CPT | Mod: ER | Performed by: PHYSICIAN ASSISTANT

## 2023-07-11 RX ORDER — FUROSEMIDE 20 MG/1
80 TABLET ORAL DAILY
Qty: 120 TABLET | Refills: 0 | Status: SHIPPED | OUTPATIENT
Start: 2023-07-11 | End: 2023-08-10

## 2023-07-11 RX ORDER — FUROSEMIDE 10 MG/ML
40 INJECTION INTRAMUSCULAR; INTRAVENOUS
Status: COMPLETED | OUTPATIENT
Start: 2023-07-11 | End: 2023-07-11

## 2023-07-11 RX ADMIN — FUROSEMIDE 40 MG: 10 INJECTION, SOLUTION INTRAMUSCULAR; INTRAVENOUS at 07:07

## 2023-07-11 NOTE — ED PROVIDER NOTES
Encounter Date: 7/11/2023       History     Chief Complaint   Patient presents with    Edema     Increased edema, sob with exertion, out of fluid medication x 4-5 days, PCP left town      This is a 38-year-old  female with significant past medical history of CHF, hypertension, asthma, and stroke who presents to ED with complaint of ankle swelling, abdominal swelling, back swelling, shortness of breath, shortness of breath with exertion because she has had increasing fluid and edema.  She states she has been out of her Lasix for roughly 1 week and the swelling has worsened since.  She states she takes 80 mg once a day in the morning.  She denies any fever, chest pain, left arm pain, jaw pain, nausea, vomiting, acute vision changes, gait instability, weakness, numbness/tingling in extremities.    Review of patient's allergies indicates:  No Known Allergies  Past Medical History:   Diagnosis Date    Asthma     CHF (congestive heart failure)     Hypertension     Stroke      Past Surgical History:   Procedure Laterality Date    breast augmention      MYOMECTOMY      TUBAL LIGATION      tummy ck  2021    lipo 360, BBL     Family History   Problem Relation Age of Onset    Ovarian cancer Maternal Grandmother     Breast cancer Neg Hx     Colon cancer Neg Hx      Social History     Tobacco Use    Smoking status: Never    Smokeless tobacco: Never   Substance Use Topics    Alcohol use: Yes     Comment: occasionally    Drug use: No     Review of Systems   Constitutional:  Positive for appetite change. Negative for fever.   Eyes:  Negative for photophobia.   Respiratory:  Negative for cough, shortness of breath, wheezing and stridor.    Cardiovascular:  Positive for leg swelling. Negative for chest pain and palpitations.   Gastrointestinal:  Positive for abdominal distention. Negative for diarrhea and nausea.   Neurological:  Negative for dizziness and headaches.   Psychiatric/Behavioral:  Negative for agitation  and behavioral problems.      Physical Exam     Initial Vitals [07/11/23 1753]   BP Pulse Resp Temp SpO2   (!) 165/98 78 18 98.5 °F (36.9 °C) 98 %      MAP       --         Physical Exam    Constitutional: She appears well-developed and well-nourished.   HENT:   Head: Normocephalic and atraumatic.   Cardiovascular:  Normal rate, regular rhythm and normal heart sounds.           Pulmonary/Chest: Breath sounds normal. No respiratory distress. She has no wheezes. She has no rales.   Mild swelling noted around bilateral ankles.   Abdominal: Abdomen is soft. Bowel sounds are normal. She exhibits distension. There is no abdominal tenderness.     Neurological: She is alert and oriented to person, place, and time.   Skin: Skin is warm and dry.   Psychiatric: She has a normal mood and affect. Thought content normal.       ED Course   Procedures  Labs Reviewed   CBC W/ AUTO DIFFERENTIAL - Abnormal; Notable for the following components:       Result Value    RBC 3.81 (*)     Hemoglobin 11.1 (*)     Hematocrit 33.7 (*)     All other components within normal limits   COMPREHENSIVE METABOLIC PANEL - Abnormal; Notable for the following components:    BUN 5 (*)     AST 70 (*)     ALT 89 (*)     All other components within normal limits   NT-PRO NATRIURETIC PEPTIDE - Abnormal; Notable for the following components:    NT-proBNP 507 (*)     All other components within normal limits   TROPONIN I     EKG Readings: (Independently Interpreted)   Rhythm: Normal Sinus Rhythm. Heart Rate: 76. Clinical Impression: Normal Sinus Rhythm     Imaging Results              X-Ray Chest AP Portable (Final result)  Result time 07/11/23 19:45:14      Final result by Saw Fox MD (07/11/23 19:45:14)                   Impression:      No acute abnormality.      Electronically signed by: Saw Fox  Date:    07/11/2023  Time:    19:45               Narrative:    EXAMINATION:  XR CHEST AP PORTABLE    CLINICAL HISTORY:  CHF;    TECHNIQUE:  Single  frontal view of the chest was performed.    COMPARISON:  Multiple priors.    FINDINGS:  The lungs are clear, with normal appearance of pulmonary vasculature and no pleural effusion or pneumothorax.    The cardiac silhouette is normal in size. The hilar and mediastinal contours are unremarkable.    Bones are intact.                                       Medications   furosemide injection 40 mg (40 mg Intravenous Given 7/11/23 1929)     Medical Decision Making:   Initial Assessment:   This is a 38-year-old  female significant past medical history of CHF, asthma, stroke, and hypertension that presents to ED with complaint of lower extremity swelling, shortness of breath.  Differential Diagnosis:   CHF exacerbation, URI, pneumonia, MI, ACS, fluid overload  ED Management:  This is a 38-year-old  female with significant past medical history of CHF, hypertension, stroke, and asthma that presents to ED with complaint of 2 weeks increasing lower extremity edema, shortness of breath.  She states she is been out of her Lasix for roughly 1 week.  She currently has no PCP or cardiologist that she follows.  CMP shows mildly elevated liver enzymes which is consistent with previous visits and is not an acute finding.  BUN is 5.  Creatinine within normal limits.  CBC is unremarkable.  BNP is mildly elevated at 507 but clinically the patient is in no acute distress.  She is afebrile, satting 99% on room air, able to speak in complete sentences without needing to stop to breathe.  EKG showed no STEMI.  Chest x-ray showed no acute abnormality.  The patient was given 40 mg of Lasix IV and she felt much much better following this.  Because of this finding coupled with unremarkable lab work and imaging, I believe this patient is safe for discharge with close PCP and cardiology follow-up.  I do not suspect any life-threatening or emergent disease process at work.  The patient will be written a prescription  for a short supply of Lasix and provided follow-up information for [facility] and [facility].  She is to call them both 1st thing tomorrow morning.  She is to return to the ED for worsening symptoms.  She verbalized understanding and was agreeable to the treatment plan.                        Clinical Impression:   Final diagnoses:  [R06.02] Shortness of breath  [I50.9] Chronic congestive heart failure, unspecified heart failure type (Primary)  [R60.9] Edema, unspecified type        ED Disposition Condition    Discharge Stable          ED Prescriptions       Medication Sig Dispense Start Date End Date Auth. Provider    furosemide (LASIX) 20 MG tablet Take 4 tablets (80 mg total) by mouth once daily. 120 tablet 7/11/2023 8/10/2023 Dieudonne Jamil PA-C          Follow-up Information       Follow up With Specialties Details Why Contact Info Additional Information    [facility] Internal Medicine   [address] [facility] [address]  [place] [address]  [phone] Please park in [address] or D and use [address]. Take Medical Office Bldg elevators.    [facility] Cardiology   [address] [facility] [address]  [place] [address]  [phone] Please park in [address] C or D and use [address].             Dieudonne Jamil PA-C  07/11/23 7132

## 2023-07-12 NOTE — ED NOTES
Review of patient's allergies indicates:  No Known Allergies     Patient has verified the spelling of the name and  on armband.   APPEARANCE: Patient is alert, calm, oriented x 4, and does not appear distressed.  SKIN: Skin is normal for race, warm, and dry. Normal skin turgor and mucous membranes moist.  CARDIAC: Normal rate and rhythm, no murmur heard. Has a hx of CHF  RESPIRATORY:Normal rate and effort. Breath sounds clear bilaterally throughout chest. Respirations are equal and unlabored.  Patient c/o SOB that is worse with exertion with a hx of CHF.   GASTRO: Bowel sounds normal, abdomen is soft, no tenderness, and no abdominal distention.  MUSCLE: Full ROM. No bony tenderness or soft tissue tenderness. No obvious deformity.  PERIPHERAL VASCULAR: peripheral pulses present. Normal cap refill. Warm to touch. +1 pitting edema to ankles. Mild dependent edema noted to medial knees and patient reports that he abdomen is swollen.   NEURO: 5/5 strength major flexors/extensors bilaterally. Sensory intact to light touch bilaterally. Jeanine coma scale: eyes open spontaneously-4, oriented & converses-5, obeys commands-6. No neurological abnormalities.   MENTAL STATUS: awake, alert and aware of environment.  EYE: No overt deficits noted. No drainage. Sclera WNL  ENT: EARS: no obvious drainage. NOSE: no active bleeding. THROAT: no redness or swelling.  : Voids without complication per patient

## 2023-08-12 NOTE — TELEPHONE ENCOUNTER
----- Message from Trisha Chapman sent at 3/27/2020 11:11 AM CDT -----  Contact: [phone]-self  Patient is requesting a call back concerning a Video Chat with the Dr about getting tested for Covid-19. Please call     

## 2023-09-05 ENCOUNTER — HOSPITAL ENCOUNTER (EMERGENCY)
Facility: HOSPITAL | Age: 38
Discharge: HOME | End: 2023-09-05
Attending: EMERGENCY MEDICINE
Payer: MEDICAID

## 2023-09-05 VITALS
DIASTOLIC BLOOD PRESSURE: 78 MMHG | BODY MASS INDEX: 35.87 KG/M2 | WEIGHT: 190 LBS | OXYGEN SATURATION: 99 % | SYSTOLIC BLOOD PRESSURE: 117 MMHG | TEMPERATURE: 99 F | RESPIRATION RATE: 18 BRPM | HEIGHT: 61 IN | HEART RATE: 85 BPM

## 2023-09-05 DIAGNOSIS — B34.9 VIRAL SYNDROME: Primary | ICD-10-CM

## 2023-09-05 LAB
B-HCG UR QL: NEGATIVE
CTP QC/QA: YES
INFLUENZA A, MOLECULAR: NEGATIVE
INFLUENZA B, MOLECULAR: NEGATIVE
SARS-COV-2 RDRP RESP QL NAA+PROBE: NEGATIVE
SPECIMEN SOURCE: NORMAL

## 2023-09-05 PROCEDURE — U0002 COVID-19 LAB TEST NON-CDC: HCPCS | Mod: ER | Performed by: EMERGENCY MEDICINE

## 2023-09-05 PROCEDURE — 87502 INFLUENZA DNA AMP PROBE: CPT | Mod: ER | Performed by: EMERGENCY MEDICINE

## 2023-09-05 PROCEDURE — 99283 EMERGENCY DEPT VISIT LOW MDM: CPT | Mod: ER

## 2023-09-05 PROCEDURE — 81025 URINE PREGNANCY TEST: CPT | Mod: ER | Performed by: EMERGENCY MEDICINE

## 2023-09-05 PROCEDURE — 25000003 PHARM REV CODE 250: Mod: ER | Performed by: EMERGENCY MEDICINE

## 2023-09-05 RX ORDER — ONDANSETRON 4 MG/1
4 TABLET, ORALLY DISINTEGRATING ORAL EVERY 6 HOURS PRN
Qty: 12 TABLET | Refills: 0 | Status: SHIPPED | OUTPATIENT
Start: 2023-09-05

## 2023-09-05 RX ORDER — ONDANSETRON 4 MG/1
4 TABLET, ORALLY DISINTEGRATING ORAL
Status: COMPLETED | OUTPATIENT
Start: 2023-09-05 | End: 2023-09-05

## 2023-09-05 RX ADMIN — ONDANSETRON 4 MG: 4 TABLET, ORALLY DISINTEGRATING ORAL at 09:09

## 2023-09-06 NOTE — ED NOTES
Patient states she is unable to urinate. Just used the restroom prior to being roomed. PT asked for 10 minutes and she will try. Urine specimen cup provided at bedside.

## 2023-09-06 NOTE — ED PROVIDER NOTES
ED Provider Note - 9/5/2023    History     Chief Complaint   Patient presents with    COVID-19 Concerns     Nausea and vomiting for a few days, was around a sick person. Concern for covid, no SOB, urinating without issue      Patient currently presents with concern regarding viral symptoms.  Onset noted 3 days ago.  Symptoms include congestion and cough.  Patient denies associated SOB.  Associated GI symptoms include nausea and vomiting.  Patient is aware of recent ill contacts.          Review of patient's allergies indicates:  No Known Allergies  Past Medical History:   Diagnosis Date    Asthma     CHF (congestive heart failure)     Hypertension     Stroke      Past Surgical History:   Procedure Laterality Date    breast augmention      MYOMECTOMY      TUBAL LIGATION      tummy tuck  2021    lipo 360, BBL     Family History   Problem Relation Age of Onset    Ovarian cancer Maternal Grandmother     Breast cancer Neg Hx     Colon cancer Neg Hx      Social History     Tobacco Use    Smoking status: Never    Smokeless tobacco: Never   Substance Use Topics    Alcohol use: Yes     Comment: occasionally    Drug use: No     Review of Systems   Constitutional:  Negative for chills and fever.   HENT:  Positive for congestion. Negative for rhinorrhea.    Respiratory:  Positive for cough. Negative for shortness of breath.    Cardiovascular:  Negative for chest pain and palpitations.   Gastrointestinal:  Positive for nausea and vomiting. Negative for abdominal pain and diarrhea.   Genitourinary:  Negative for difficulty urinating and dysuria.   Skin:  Negative for color change and rash.   Neurological:  Negative for dizziness and light-headedness.   Hematological:  Negative for adenopathy. Does not bruise/bleed easily.       Physical Exam     Initial Vitals [09/05/23 2035]   BP Pulse Resp Temp SpO2   117/78 85 18 98.7 °F (37.1 °C) 99 %      MAP       --         Physical Exam    Nursing note and vitals  reviewed.  Constitutional: She appears well-developed and well-nourished. She is not diaphoretic. No distress.   HENT:   Head: Normocephalic and atraumatic.   Nose: Nose normal.   Mouth/Throat: Oropharynx is clear and moist.   Eyes: Conjunctivae are normal. No scleral icterus.   Cardiovascular:  Normal rate, regular rhythm, normal heart sounds and intact distal pulses.           Pulmonary/Chest: Breath sounds normal. No respiratory distress.   Abdominal: Abdomen is soft. There is no abdominal tenderness.   Musculoskeletal:         General: No edema. Normal range of motion.     Neurological: She is alert and oriented to person, place, and time. She has normal strength.   Skin: Skin is warm and dry.         ED Course   Procedures                   MDM  Differential Diagnoses   Based on available history, the working differential diagnoses considered during this evaluation include but are not limited to viral syndrome including influenza and Covid 19, bronchitis, pneumonia, and sinusitis.      LABS     Labs Reviewed   INFLUENZA A & B BY MOLECULAR   SARS-COV-2 RNA AMPLIFICATION, QUAL    Narrative:     Is the patient symptomatic?->Yes   POCT URINE PREGNANCY     All available results from the labs ordered were independently reviewed.  CoVid Screen negative, Flu Screen negative, and UPT negative    Imaging     Imaging Results    None            EKG        ED Management/Discussion     Medications   ondansetron disintegrating tablet 4 mg (4 mg Oral Given 9/5/23 2137)                   The patient's list of active medical problems, social history, medications, and allergies as documented per RN staff has been reviewed.                 I see no indication of an emergent process beyond that addressed during our encounter but have duly counseled the patient/family regarding the need for prompt follow-up as well as the indications that should prompt immediate return to the emergency room should new or worrisome developments  occur.  The patient/family has been provided with verbal and printed direction regarding our final diagnosis(es) as well as instructions regarding use of OTC and/or Rx medications intended to manage the patient's aforementioned conditions including:  ED Prescriptions       Medication Sig Dispense Start Date End Date Auth. Provider    ondansetron (ZOFRAN-ODT) 4 MG TbDL Take 1 tablet (4 mg total) by mouth every 6 (six) hours as needed (nausea). 12 tablet 9/5/2023 -- Niko Reddy MD              Patient has been advised of the following recommended follow-up instructions:  Follow-up Information       Follow up With Specialties Details Why Contact Info    Leana Feliz, DO Urgent Care Schedule an appointment as soon as possible for a visit  for reassessment [address] [address] [phone]      [facility] - Emergency Dept Emergency Medicine Go to  As needed, If symptoms worsen [address] [facility] [facility] [address] [phone]          The patient/family communicates understanding of all this information and all remaining questions and concerns were addressed at this time.      Referrals:  No orders of the defined types were placed in this encounter.      CLINICAL IMPRESSION    ICD-10-CM ICD-9-CM   1. Viral syndrome  B34.9 079.99          ED Disposition Condition    Discharge Stable               Niko Reddy MD  09/06/23 0600

## 2023-10-03 ENCOUNTER — PATIENT MESSAGE (OUTPATIENT)
Dept: ADMINISTRATIVE | Facility: HOSPITAL | Age: 38
End: 2023-10-03
Payer: MEDICAID

## 2023-11-11 ENCOUNTER — HOSPITAL ENCOUNTER (EMERGENCY)
Facility: HOSPITAL | Age: 38
Discharge: HOME | End: 2023-11-11
Attending: FAMILY MEDICINE
Payer: MEDICAID

## 2023-11-11 VITALS
BODY MASS INDEX: 33.61 KG/M2 | TEMPERATURE: 99 F | OXYGEN SATURATION: 95 % | WEIGHT: 178 LBS | SYSTOLIC BLOOD PRESSURE: 128 MMHG | DIASTOLIC BLOOD PRESSURE: 64 MMHG | RESPIRATION RATE: 16 BRPM | HEART RATE: 108 BPM | HEIGHT: 61 IN

## 2023-11-11 DIAGNOSIS — J06.9 VIRAL URI WITH COUGH: ICD-10-CM

## 2023-11-11 DIAGNOSIS — J10.1 INFLUENZA A: Primary | ICD-10-CM

## 2023-11-11 LAB
GROUP A STREP, MOLECULAR: NEGATIVE
INFLUENZA A, MOLECULAR: POSITIVE
INFLUENZA B, MOLECULAR: NEGATIVE
SARS-COV-2 RDRP RESP QL NAA+PROBE: NEGATIVE
SPECIMEN SOURCE: ABNORMAL

## 2023-11-11 PROCEDURE — 87502 INFLUENZA DNA AMP PROBE: CPT | Mod: ER | Performed by: FAMILY MEDICINE

## 2023-11-11 PROCEDURE — U0002 COVID-19 LAB TEST NON-CDC: HCPCS | Mod: ER | Performed by: FAMILY MEDICINE

## 2023-11-11 PROCEDURE — 25000003 PHARM REV CODE 250: Mod: ER | Performed by: FAMILY MEDICINE

## 2023-11-11 PROCEDURE — 99284 EMERGENCY DEPT VISIT MOD MDM: CPT | Mod: ER

## 2023-11-11 PROCEDURE — 87651 STREP A DNA AMP PROBE: CPT | Mod: ER | Performed by: FAMILY MEDICINE

## 2023-11-11 RX ORDER — OSELTAMIVIR PHOSPHATE 75 MG/1
75 CAPSULE ORAL 2 TIMES DAILY
Qty: 10 CAPSULE | Refills: 0 | Status: SHIPPED | OUTPATIENT
Start: 2023-11-11 | End: 2023-11-16

## 2023-11-11 RX ORDER — BENZONATATE 200 MG/1
200 CAPSULE ORAL 3 TIMES DAILY PRN
Qty: 30 CAPSULE | Refills: 0 | Status: SHIPPED | OUTPATIENT
Start: 2023-11-11 | End: 2023-11-21

## 2023-11-11 RX ORDER — KETOROLAC TROMETHAMINE 10 MG/1
10 TABLET, FILM COATED ORAL
Status: COMPLETED | OUTPATIENT
Start: 2023-11-11 | End: 2023-11-11

## 2023-11-11 RX ORDER — ACETAMINOPHEN 325 MG/1
650 TABLET ORAL
Status: COMPLETED | OUTPATIENT
Start: 2023-11-11 | End: 2023-11-11

## 2023-11-11 RX ORDER — PROMETHAZINE HYDROCHLORIDE AND DEXTROMETHORPHAN HYDROBROMIDE 6.25; 15 MG/5ML; MG/5ML
5 SYRUP ORAL EVERY 8 HOURS PRN
Qty: 150 ML | Refills: 0 | Status: SHIPPED | OUTPATIENT
Start: 2023-11-11 | End: 2023-11-21

## 2023-11-11 RX ADMIN — ACETAMINOPHEN 650 MG: 325 TABLET ORAL at 07:11

## 2023-11-11 RX ADMIN — KETOROLAC TROMETHAMINE 10 MG: 10 TABLET, FILM COATED ORAL at 07:11

## 2023-11-11 NOTE — ED PROVIDER NOTES
Encounter Date: 11/11/2023       History     Chief Complaint   Patient presents with    Cough    Fever    Nasal Congestion     Pt reports subjective fever, cough and runny nose x2 days. Pt also c/o ear pain and headache.      38-year-old female complains of sore throat, headache, mild cough for last 2 days.  Also has nasal congestion.  Took Excedrin migraine with no improvement.    The history is provided by the patient.     Review of patient's allergies indicates:  No Known Allergies  Past Medical History:   Diagnosis Date    Asthma     CHF (congestive heart failure)     Hypertension     Stroke      Past Surgical History:   Procedure Laterality Date    breast augmention      MYOMECTOMY      TUBAL LIGATION      tummy tuck  2021    lipo 360, BBL     Family History   Problem Relation Age of Onset    Ovarian cancer Maternal Grandmother     Breast cancer Neg Hx     Colon cancer Neg Hx      Social History     Tobacco Use    Smoking status: Never    Smokeless tobacco: Never   Substance Use Topics    Alcohol use: Yes     Comment: occasionally    Drug use: No     Review of Systems   Constitutional:  Positive for fever.   HENT:  Positive for sore throat.    Musculoskeletal:  Positive for myalgias.   Neurological:  Positive for headaches.   All other systems reviewed and are negative.      Physical Exam     Initial Vitals [11/11/23 0638]   BP Pulse Resp Temp SpO2   (!) 128/94 108 20 98.2 °F (36.8 °C) 95 %      MAP       --         Physical Exam    Nursing note and vitals reviewed.  Constitutional: Vital signs are normal. She appears well-developed and well-nourished. She is active. No distress.   HENT:   Head: Normocephalic.   Nose: Nose normal.   Mouth/Throat: Oropharynx is clear and moist and mucous membranes are normal.   Eyes: Conjunctivae, EOM and lids are normal.   Neck: Neck supple.   Normal range of motion.  Cardiovascular:  Normal rate, regular rhythm, S1 normal, S2 normal and normal heart sounds.            Pulmonary/Chest: Breath sounds normal. No respiratory distress. She has no wheezes. She has no rhonchi. She has no rales.   Abdominal: Abdomen is soft. Bowel sounds are normal. She exhibits no distension. There is no abdominal tenderness.   Musculoskeletal:         General: Normal range of motion.      Right upper arm: Normal.      Left upper arm: Normal.      Cervical back: Normal range of motion and neck supple.      Right lower leg: Normal.      Left lower leg: Normal.     Neurological: She is alert and oriented to person, place, and time. She has normal strength. GCS score is 15. GCS eye subscore is 4. GCS verbal subscore is 5. GCS motor subscore is 6.   Skin: Skin is warm. Capillary refill takes less than 2 seconds.   Psychiatric: She has a normal mood and affect. Her speech is normal and behavior is normal. Thought content normal. Cognition and memory are normal.         ED Course   Procedures  Labs Reviewed   INFLUENZA A & B BY MOLECULAR - Abnormal; Notable for the following components:       Result Value    Influenza A, Molecular Positive (*)     All other components within normal limits   GROUP A STREP, MOLECULAR   SARS-COV-2 RNA AMPLIFICATION, QUAL    Narrative:     Is the patient symptomatic?->Yes          Imaging Results    None          Medications   ketorolac tablet 10 mg (10 mg Oral Given 11/11/23 0713)   acetaminophen tablet 650 mg (650 mg Oral Given 11/11/23 0712)     Medical Decision Making  Differential diagnosis include and not limited to =bronchitis, COVID, influenza, URI, viral syndrome.    POSITIVE for influenza A  COVID and strep are negative    Symptomatic treatment with Tylenol, Motrin, prescription for Tamiflu, adequate hydration and nutrition.  Follow-up PCP/ED with any worsening symptoms.    Amount and/or Complexity of Data Reviewed  Labs: ordered. Decision-making details documented in ED Course.     Details: Positive for influenza A.  Negative for COVID and strep.    Risk  OTC  drugs.  Prescription drug management.                               Clinical Impression:   Final diagnoses:  [J10.1] Influenza A (Primary)  [J06.9] Viral URI with cough        ED Disposition Condition    Discharge Stable          ED Prescriptions       Medication Sig Dispense Start Date End Date Auth. Provider    oseltamivir (TAMIFLU) 75 MG capsule Take 1 capsule (75 mg total) by mouth 2 (two) times daily. for 5 days 10 capsule 11/11/2023 11/16/2023 Viral Grant MD    benzonatate (TESSALON) 200 MG capsule Take 1 capsule (200 mg total) by mouth 3 (three) times daily as needed for Cough. 30 capsule 11/11/2023 11/21/2023 Viral Grant MD          Follow-up Information       Follow up With Specialties Details Why Contact Info    Leana Feliz, DO Urgent Care Schedule an appointment as soon as possible for a visit in 1 week  [address] [address] [phone]               Viral Grant MD  11/11/23 0758

## 2023-11-11 NOTE — Clinical Note
"Anne"Skylar Rangel was seen and treated in our emergency department on 11/11/2023.  She may return to work on 11/14/2023.       If you have any questions or concerns, please don't hesitate to call.      Viral Grant MD"

## 2023-11-16 ENCOUNTER — HOSPITAL ENCOUNTER (EMERGENCY)
Facility: HOSPITAL | Age: 38
Discharge: HOME | End: 2023-11-16
Attending: EMERGENCY MEDICINE
Payer: MEDICAID

## 2023-11-16 VITALS
BODY MASS INDEX: 29.32 KG/M2 | HEIGHT: 65 IN | TEMPERATURE: 99 F | DIASTOLIC BLOOD PRESSURE: 76 MMHG | SYSTOLIC BLOOD PRESSURE: 117 MMHG | WEIGHT: 176 LBS | OXYGEN SATURATION: 99 % | HEART RATE: 79 BPM | RESPIRATION RATE: 19 BRPM

## 2023-11-16 DIAGNOSIS — J06.9 VIRAL URI WITH COUGH: Primary | ICD-10-CM

## 2023-11-16 DIAGNOSIS — R07.89 CHEST TIGHTNESS: ICD-10-CM

## 2023-11-16 DIAGNOSIS — M54.50 ACUTE MIDLINE LOW BACK PAIN WITHOUT SCIATICA: ICD-10-CM

## 2023-11-16 DIAGNOSIS — E87.6 HYPOKALEMIA: ICD-10-CM

## 2023-11-16 LAB
ANION GAP SERPL CALC-SCNC: 10 MMOL/L (ref 8–16)
BASOPHILS # BLD AUTO: 0.04 K/UL (ref 0–0.2)
BASOPHILS NFR BLD: 0.4 % (ref 0–1.9)
CALCIUM SERPL-MCNC: 8.5 MG/DL (ref 8.7–10.5)
CHLORIDE SERPL-SCNC: 102 MMOL/L (ref 95–110)
CO2 SERPL-SCNC: 28 MMOL/L (ref 23–29)
CREAT SERPL-MCNC: 0.67 MG/DL (ref 0.5–1.4)
DIFFERENTIAL METHOD: ABNORMAL
EOSINOPHIL # BLD AUTO: 0 K/UL (ref 0–0.5)
EOSINOPHIL NFR BLD: 0.3 % (ref 0–8)
ERYTHROCYTE [DISTWIDTH] IN BLOOD BY AUTOMATED COUNT: 14.1 % (ref 11.5–14.5)
EST. GFR  (NO RACE VARIABLE): >60 ML/MIN/1.73 M^2
GLUCOSE SERPL-MCNC: 115 MG/DL (ref 70–110)
HCT VFR BLD AUTO: 37.2 % (ref 37–48.5)
HGB BLD-MCNC: 12.3 G/DL (ref 12–16)
IMM GRANULOCYTES # BLD AUTO: 0.02 K/UL (ref 0–0.04)
IMM GRANULOCYTES NFR BLD AUTO: 0.2 % (ref 0–0.5)
LYMPHOCYTES # BLD AUTO: 4.9 K/UL (ref 1–4.8)
LYMPHOCYTES NFR BLD: 54.9 % (ref 18–48)
MAGNESIUM SERPL-MCNC: 1.9 MG/DL (ref 1.6–2.6)
MCH RBC QN AUTO: 29.4 PG (ref 27–31)
MCHC RBC AUTO-ENTMCNC: 33.1 G/DL (ref 32–36)
MCV RBC AUTO: 89 FL (ref 82–98)
MONOCYTES # BLD AUTO: 0.5 K/UL (ref 0.3–1)
MONOCYTES NFR BLD: 5.9 % (ref 4–15)
NEUTROPHILS # BLD AUTO: 3.4 K/UL (ref 1.8–7.7)
NEUTROPHILS NFR BLD: 38.3 % (ref 38–73)
NRBC BLD-RTO: 0 /100 WBC
PLATELET # BLD AUTO: 314 K/UL (ref 150–450)
PMV BLD AUTO: 11 FL (ref 9.2–12.9)
POTASSIUM SERPL-SCNC: 3.2 MMOL/L (ref 3.5–5.1)
RBC # BLD AUTO: 4.19 M/UL (ref 4–5.4)
SODIUM SERPL-SCNC: 140 MMOL/L (ref 136–145)
UUN UR-MCNC: 8 MG/DL (ref 7–17)
WBC # BLD AUTO: 8.92 K/UL (ref 3.9–12.7)

## 2023-11-16 PROCEDURE — 83735 ASSAY OF MAGNESIUM: CPT | Mod: ER

## 2023-11-16 PROCEDURE — 99284 EMERGENCY DEPT VISIT MOD MDM: CPT | Mod: 25,ER

## 2023-11-16 PROCEDURE — 25000003 PHARM REV CODE 250: Mod: ER

## 2023-11-16 PROCEDURE — 85025 COMPLETE CBC W/AUTO DIFF WBC: CPT | Mod: ER

## 2023-11-16 PROCEDURE — 80048 BASIC METABOLIC PNL TOTAL CA: CPT | Mod: ER

## 2023-11-16 RX ORDER — METHOCARBAMOL 500 MG/1
500 TABLET, FILM COATED ORAL 4 TIMES DAILY
Qty: 30 TABLET | Refills: 0 | Status: SHIPPED | OUTPATIENT
Start: 2023-11-16 | End: 2023-11-26

## 2023-11-16 RX ORDER — POTASSIUM CHLORIDE 20 MEQ/1
40 TABLET, EXTENDED RELEASE ORAL ONCE
Status: COMPLETED | OUTPATIENT
Start: 2023-11-16 | End: 2023-11-16

## 2023-11-16 RX ORDER — ACETAMINOPHEN 500 MG
1000 TABLET ORAL
Status: COMPLETED | OUTPATIENT
Start: 2023-11-16 | End: 2023-11-16

## 2023-11-16 RX ORDER — LIDOCAINE 50 MG/G
1 PATCH TOPICAL DAILY
Qty: 6 PATCH | Refills: 0 | Status: SHIPPED | OUTPATIENT
Start: 2023-11-16

## 2023-11-16 RX ORDER — CETIRIZINE HYDROCHLORIDE 10 MG/1
10 TABLET ORAL DAILY
Qty: 30 TABLET | Refills: 0 | Status: SHIPPED | OUTPATIENT
Start: 2023-11-16 | End: 2024-11-15

## 2023-11-16 RX ORDER — GUAIFENESIN 100 MG/5ML
200 SOLUTION ORAL EVERY 4 HOURS PRN
Qty: 60 ML | Refills: 0 | Status: SHIPPED | OUTPATIENT
Start: 2023-11-16 | End: 2023-11-26

## 2023-11-16 RX ADMIN — POTASSIUM CHLORIDE 40 MEQ: 1500 TABLET, EXTENDED RELEASE ORAL at 09:11

## 2023-11-16 RX ADMIN — ACETAMINOPHEN 1000 MG: 500 TABLET ORAL at 09:11

## 2023-11-17 NOTE — ED PROVIDER NOTES
Encounter Date: 11/16/2023       History     Chief Complaint   Patient presents with    Influenza     Diagnosed with the flu 6 days ago. Patient continues to have nasal congestion, bilateral ear pain , sore throat and cough.      38-year-old female with past medical history of asthma, CHF, hypertension, stroke presents to the ED with URI x 6 days.  Of note, patient was seen in the ED on 11/11 with the same complaint.  Patient was discharged with a Tamiflu, Tessalon Perles, promethazine with minor improvement of her symptoms.  Patient reports lack of appetite, worsening productive cough, lower back pain, worsening congestion.  Patient denies recent back injury/trauma.   No fever, nausea,, abdominal pain, diarrhea, chest pain, shortness of breath.        Review of patient's allergies indicates:  No Known Allergies  Past Medical History:   Diagnosis Date    Asthma     CHF (congestive heart failure)     Hypertension     Stroke      Past Surgical History:   Procedure Laterality Date    breast augmention      MYOMECTOMY      TUBAL LIGATION      tummy [facility]  2021    lipo 360, BBL     Family History   Problem Relation Age of Onset    Ovarian cancer Maternal Grandmother     Breast cancer Neg Hx     Colon cancer Neg Hx      Social History     Tobacco Use    Smoking status: Never    Smokeless tobacco: Never   Substance Use Topics    Alcohol use: Yes     Comment: occasionally    Drug use: No     Review of Systems   Constitutional:  Negative for activity change, appetite change, chills, fatigue and fever.   HENT:  Positive for congestion and rhinorrhea. Negative for ear discharge, ear pain, sinus pressure, sinus pain, sneezing, sore throat and tinnitus.    Respiratory:  Positive for cough. Negative for chest tightness, shortness of breath, wheezing and stridor.    Cardiovascular:  Negative for chest pain and leg swelling.   Gastrointestinal:  Negative for abdominal distention, abdominal pain, diarrhea, nausea and vomiting.    Genitourinary:  Negative for flank pain, frequency and urgency.   Musculoskeletal:  Positive for back pain and myalgias. Negative for neck pain and neck stiffness.   Skin:  Negative for pallor and rash.   Neurological:  Negative for dizziness, syncope, weakness, light-headedness, numbness and headaches.       Physical Exam     Initial Vitals [11/16/23 1843]   BP Pulse Resp Temp SpO2   122/74 90 20 98.6 °F (37 °C) 98 %      MAP       --         Physical Exam    Vitals reviewed.  Constitutional: She appears well-developed and well-nourished.   HENT:   Head: Normocephalic and atraumatic.   Nose: Nose normal.   Mouth/Throat: Oropharynx is clear and moist. No oropharyngeal exudate.   Posterior oropharyngeal erythema with no tonsillar exudates bilaterally.  Uvula midline.    Eyes: EOM are normal. Pupils are equal, round, and reactive to light.   Neck: Neck supple.   Normal range of motion.  Cardiovascular:  Normal rate and normal heart sounds.           Pulmonary/Chest: Breath sounds normal. No respiratory distress. She has no wheezes. She has no rhonchi. She has no rales. She exhibits no tenderness.   Abdominal: Abdomen is soft. Bowel sounds are normal. She exhibits no distension. There is no abdominal tenderness. There is no rebound and no guarding.   Musculoskeletal:         General: Tenderness present. Normal range of motion.      Cervical back: Normal range of motion and neck supple.      Comments: Mild TTP to paraspinal lumbar region BL. No lower extremity weakness, numbness, tingling bilaterally.     Neurological: She is alert and oriented to person, place, and time. GCS score is 15. GCS eye subscore is 4. GCS verbal subscore is 5. GCS motor subscore is 6.   Skin: Skin is warm. Capillary refill takes less than 2 seconds.   Psychiatric: She has a normal mood and affect. Her behavior is normal. Judgment and thought content normal.         ED Course   Procedures  Labs Reviewed   BASIC METABOLIC PANEL - Abnormal;  Notable for the following components:       Result Value    Potassium 3.2 (*)     Glucose 115 (*)     Calcium 8.5 (*)     All other components within normal limits   CBC W/ AUTO DIFFERENTIAL - Abnormal; Notable for the following components:    Lymph # 4.9 (*)     Lymph % 54.9 (*)     All other components within normal limits   MAGNESIUM          Imaging Results              X-Ray Chest AP Portable (Final result)  Result time 11/16/23 21:14:50      Final result by Amadeo Corona MD (11/16/23 21:14:50)                   Impression:      Low lung volumes.  Mild perihilar interstitial opacities.  Correlate clinically for element of bronchitis.      Electronically signed by: Amaedo Corona  Date:    11/16/2023  Time:    21:14               Narrative:    EXAMINATION:  XR CHEST AP PORTABLE    CLINICAL HISTORY:  Other chest pain    TECHNIQUE:  Single frontal view of the chest was performed.    COMPARISON:  None    FINDINGS:  Low lung volumes.  Mild perihilar interstitial opacities.The lungs are otherwise clear, with normal appearance of pulmonary vasculature and no pleural effusion or pneumothorax.    The cardiac silhouette is normal in size. The hilar and mediastinal contours are unremarkable.    Bones are intact.                                       Medications   potassium chloride SA CR tablet 40 mEq (40 mEq Oral Given 11/16/23 2101)   acetaminophen tablet 1,000 mg (1,000 mg Oral Given 11/16/23 2100)     Medical Decision Making  Differential Diagnosis includes, but is not limited to:  Sepsis, meningitis, cavernous sinus thrombosis, nasal foreign body, otitis media/external, nasal polyp, bacterial sinusitis, allergic rhinitis, influenza, bacterial/viral pharyngitis, peritonsillar abscess, retropharyngeal abscess, bacterial/viral pneumonia.    ED management     38 year-old female with past medical history of asthma, CHF, hypertension, stroke presents to the ED with URI x 6 days. Patient is not toxic appearing,  hemodynamically stable and resting comfortably on bed. Afebrile with vitals WNL. No respiratory distress on exam.  Physical exam as stated above.  No physical signs of dehydration on exam.  Lab results significant for mild hypokalemia. Patient was given potassium tablet in the ED.   Imaging results discussed on ED course.  Based on clinical presentation and physical exam, I do not suspect pneumonia at this time.  My overall impression is influenza.  Will prescribe Robitussin, guaifenesin, Zyrtec as needed.  Will send home with a prescription of Robaxin and lidocaine patches for back pain.  Patient was given potassium diet discharge instructions.  Patient stable throughout ED visit.  Advised if symptoms worsens within a week or fail to improve, she should return to the ED for further evaluation.    I have discussed the specifics of the workup with the patient and the patient has verbalized understanding of the details of the workup, the diagnosis, the treatment plan, and the need for outpatient follow-up with PCP. ED precautions given. Discussed with pt about returning to the ED, if symptoms fail to improve or worsen. Care of patient also discussed with Dr. Denise who agrees with assessment and plan.    RESULTS:  Documented in ED course. Labs/ekg interpreted by myself and Dr. Denise.                Amount and/or Complexity of Data Reviewed  Labs: ordered. Decision-making details documented in ED Course.  Radiology: ordered. Decision-making details documented in ED Course.    Risk  OTC drugs.  Prescription drug management.               ED Course as of 11/17/23 1251   Thu Nov 16, 2023 1940 Magnesium : 1.9 [NW]   1940 Potassium(!): 3.2  Mild Hypokalemia 3.2 otherwise grossly unremarklable. Will replete potassium at this time. [NW]   1943 CBC auto differential  WNL [NW]   2125 X-Ray Chest AP Portable  Independently reviewed by myself and radiologist.  FINDINGS:  Low lung volumes.  Mild perihilar interstitial  opacities.The lungs are otherwise clear, with normal appearance of pulmonary vasculature and no pleural effusion or pneumothorax.     The cardiac silhouette is normal in size. The hilar and mediastinal contours are unremarkable.     Bones are intact.     Impression:     Low lung volumes.  Mild perihilar interstitial opacities.  Correlate clinically for element of bronchitis.   [NW]   2125 Patient endorsing low back pain, we will give Tylenol at this time. [NW]      ED Course User Index  [NW] Elsie Chavarria PA-C                        Clinical Impression:  Final diagnoses:  [R07.89] Chest tightness  [J06.9] Viral URI with cough (Primary)  [M54.50] Acute midline low back pain without sciatica  [E87.6] Hypokalemia          ED Disposition Condition    Discharge Stable          ED Prescriptions       Medication Sig Dispense Start Date End Date Auth. Provider    methocarbamoL (ROBAXIN) 500 MG Tab Take 1 tablet (500 mg total) by mouth 4 (four) times daily. for 10 days 30 tablet 11/16/2023 11/26/2023 Elsie Chavarria PA-C    guaiFENesin 100 mg/5 ml (ROBITUSSIN) 100 mg/5 mL syrup Take 10 mLs (200 mg total) by mouth every 4 (four) hours as needed for Cough. 60 mL 11/16/2023 11/26/2023 Elsie Chavarria PA-C    LIDOcaine (LIDODERM) 5 % Place 1 patch onto the skin once daily. Remove & Discard patch within 12 hours or as directed by MD 6 patch 11/16/2023 -- Elsie Chavarria PA-C    cetirizine (ZYRTEC) 10 MG tablet Take 1 tablet (10 mg total) by mouth once daily. 30 tablet 11/16/2023 11/15/2024 Elsie Chavarria PA-C          Follow-up Information       Follow up With Specialties Details Why Contact Info    Leana Feliz, DO Urgent Care Schedule an appointment as soon as possible for a visit in 2 days As needed, If symptoms worsen [address] [address] [phone]               Elsie Chavarria PA-C  11/17/23 0505

## 2023-11-17 NOTE — DISCHARGE INSTRUCTIONS
Ms. Rangel,     Thank you for letting me care for you today! It was nice meeting you, and I hope you feel better soon.   If you would like access to your chart and what was done today please utilize the [facility].   Please don't hesitate to return if your symptoms worsen or you develop any other worrisome symptoms.    Our goal in the emergency department is to always give you outstanding care and exceptional service. You may receive a survey by mail or e-mail in the next week regarding your experience in our ED. We would greatly appreciate you completing and returning the survey. Your feedback provides us with a way to recognize our staff who give very good care and it helps us learn how to improve when your experience was below our aspiration of excellence.     Sincerely,    Elsie Chavarria PA-C  Emergency Department Physician Assistant  [facility], [place], and [facility]

## 2023-11-21 ENCOUNTER — PATIENT MESSAGE (OUTPATIENT)
Dept: ADMINISTRATIVE | Facility: HOSPITAL | Age: 38
End: 2023-11-21
Payer: MEDICAID

## 2024-05-05 ENCOUNTER — HOSPITAL ENCOUNTER (EMERGENCY)
Facility: HOSPITAL | Age: 39
Discharge: HOME | End: 2024-05-06
Attending: EMERGENCY MEDICINE
Payer: MEDICAID

## 2024-05-05 DIAGNOSIS — R07.9 CHEST PAIN, UNSPECIFIED TYPE: ICD-10-CM

## 2024-05-05 DIAGNOSIS — R06.02 SOB (SHORTNESS OF BREATH): ICD-10-CM

## 2024-05-05 DIAGNOSIS — R74.01 TRANSAMINITIS: ICD-10-CM

## 2024-05-05 DIAGNOSIS — R11.2 NAUSEA AND VOMITING, UNSPECIFIED VOMITING TYPE: Primary | ICD-10-CM

## 2024-05-05 LAB
ALBUMIN SERPL BCP-MCNC: 4.6 G/DL (ref 3.5–5.2)
ALP SERPL-CCNC: 62 U/L (ref 38–126)
ALT SERPL W/O P-5'-P-CCNC: 131 U/L (ref 10–44)
ANION GAP SERPL CALC-SCNC: 11 MMOL/L (ref 8–16)
AST SERPL-CCNC: 75 U/L (ref 15–46)
B-HCG UR QL: NEGATIVE
BASOPHILS # BLD AUTO: 0.04 K/UL (ref 0–0.2)
BASOPHILS NFR BLD: 0.6 % (ref 0–1.9)
BILIRUB SERPL-MCNC: 1.3 MG/DL (ref 0.1–1)
BILIRUB UR QL STRIP: ABNORMAL
CALCIUM SERPL-MCNC: 9.4 MG/DL (ref 8.7–10.5)
CHLORIDE SERPL-SCNC: 101 MMOL/L (ref 95–110)
CLARITY UR REFRACT.AUTO: CLEAR
CO2 SERPL-SCNC: 28 MMOL/L (ref 23–29)
COLOR UR AUTO: YELLOW
CREAT SERPL-MCNC: 0.85 MG/DL (ref 0.5–1.4)
CTP QC/QA: YES
DIFFERENTIAL METHOD BLD: NORMAL
EOSINOPHIL # BLD AUTO: 0.1 K/UL (ref 0–0.5)
EOSINOPHIL NFR BLD: 1.9 % (ref 0–8)
ERYTHROCYTE [DISTWIDTH] IN BLOOD BY AUTOMATED COUNT: 13.8 % (ref 11.5–14.5)
EST. GFR  (NO RACE VARIABLE): >60 ML/MIN/1.73 M^2
GLUCOSE SERPL-MCNC: 94 MG/DL (ref 70–110)
GLUCOSE UR QL STRIP: NEGATIVE
HCT VFR BLD AUTO: 40.2 % (ref 37–48.5)
HGB BLD-MCNC: 13.3 G/DL (ref 12–16)
HGB UR QL STRIP: ABNORMAL
IMM GRANULOCYTES # BLD AUTO: 0.01 K/UL (ref 0–0.04)
IMM GRANULOCYTES NFR BLD AUTO: 0.1 % (ref 0–0.5)
KETONES UR QL STRIP: ABNORMAL
LEUKOCYTE ESTERASE UR QL STRIP: NEGATIVE
LYMPHOCYTES # BLD AUTO: 3.1 K/UL (ref 1–4.8)
LYMPHOCYTES NFR BLD: 45 % (ref 18–48)
MCH RBC QN AUTO: 29.3 PG (ref 27–31)
MCHC RBC AUTO-ENTMCNC: 33.1 G/DL (ref 32–36)
MCV RBC AUTO: 89 FL (ref 82–98)
MONOCYTES # BLD AUTO: 0.5 K/UL (ref 0.3–1)
MONOCYTES NFR BLD: 7.4 % (ref 4–15)
NEUTROPHILS # BLD AUTO: 3.1 K/UL (ref 1.8–7.7)
NEUTROPHILS NFR BLD: 45 % (ref 38–73)
NITRITE UR QL STRIP: NEGATIVE
NRBC BLD-RTO: 0 /100 WBC
NT-PROBNP SERPL-MCNC: <20 PG/ML (ref 5–450)
PH UR STRIP: 7 [PH] (ref 5–8)
PLATELET # BLD AUTO: 339 K/UL (ref 150–450)
PMV BLD AUTO: 12 FL (ref 9.2–12.9)
POTASSIUM SERPL-SCNC: 3.3 MMOL/L (ref 3.5–5.1)
PROT SERPL-MCNC: 8.3 G/DL (ref 6–8.4)
PROT UR QL STRIP: NEGATIVE
RBC # BLD AUTO: 4.54 M/UL (ref 4–5.4)
SODIUM SERPL-SCNC: 140 MMOL/L (ref 136–145)
SP GR UR STRIP: 1.02 (ref 1–1.03)
TROPONIN I SERPL-MCNC: <0.012 NG/ML (ref 0.01–0.03)
URN SPEC COLLECT METH UR: ABNORMAL
UROBILINOGEN UR STRIP-ACNC: ABNORMAL EU/DL
UUN UR-MCNC: 8 MG/DL (ref 7–17)
WBC # BLD AUTO: 6.98 K/UL (ref 3.9–12.7)

## 2024-05-05 PROCEDURE — 80053 COMPREHEN METABOLIC PANEL: CPT | Mod: ER | Performed by: EMERGENCY MEDICINE

## 2024-05-05 PROCEDURE — 84484 ASSAY OF TROPONIN QUANT: CPT | Mod: ER | Performed by: EMERGENCY MEDICINE

## 2024-05-05 PROCEDURE — 63600175 PHARM REV CODE 636 W HCPCS: Mod: ER | Performed by: EMERGENCY MEDICINE

## 2024-05-05 PROCEDURE — 96374 THER/PROPH/DIAG INJ IV PUSH: CPT | Mod: ER

## 2024-05-05 PROCEDURE — 96375 TX/PRO/DX INJ NEW DRUG ADDON: CPT | Mod: ER

## 2024-05-05 PROCEDURE — 99900035 HC TECH TIME PER 15 MIN (STAT): Mod: ER

## 2024-05-05 PROCEDURE — 93005 ELECTROCARDIOGRAM TRACING: CPT | Mod: ER

## 2024-05-05 PROCEDURE — 83880 ASSAY OF NATRIURETIC PEPTIDE: CPT | Mod: ER | Performed by: EMERGENCY MEDICINE

## 2024-05-05 PROCEDURE — 85025 COMPLETE CBC W/AUTO DIFF WBC: CPT | Mod: ER | Performed by: EMERGENCY MEDICINE

## 2024-05-05 PROCEDURE — 81025 URINE PREGNANCY TEST: CPT | Mod: ER | Performed by: EMERGENCY MEDICINE

## 2024-05-05 PROCEDURE — 81003 URINALYSIS AUTO W/O SCOPE: CPT | Mod: ER | Performed by: EMERGENCY MEDICINE

## 2024-05-05 PROCEDURE — 93010 ELECTROCARDIOGRAM REPORT: CPT | Mod: ,,, | Performed by: INTERNAL MEDICINE

## 2024-05-05 PROCEDURE — 99285 EMERGENCY DEPT VISIT HI MDM: CPT | Mod: 25,ER

## 2024-05-05 RX ORDER — METOCLOPRAMIDE HYDROCHLORIDE 5 MG/ML
10 INJECTION INTRAMUSCULAR; INTRAVENOUS
Status: COMPLETED | OUTPATIENT
Start: 2024-05-05 | End: 2024-05-05

## 2024-05-05 RX ORDER — KETOROLAC TROMETHAMINE 30 MG/ML
10 INJECTION, SOLUTION INTRAMUSCULAR; INTRAVENOUS
Status: COMPLETED | OUTPATIENT
Start: 2024-05-05 | End: 2024-05-05

## 2024-05-05 RX ORDER — ONDANSETRON HYDROCHLORIDE 2 MG/ML
4 INJECTION, SOLUTION INTRAVENOUS
Status: COMPLETED | OUTPATIENT
Start: 2024-05-05 | End: 2024-05-05

## 2024-05-05 RX ORDER — SEMAGLUTIDE 1.34 MG/ML
1 INJECTION, SOLUTION SUBCUTANEOUS
COMMUNITY
Start: 2024-04-27

## 2024-05-05 RX ADMIN — METOCLOPRAMIDE 10 MG: 5 INJECTION, SOLUTION INTRAMUSCULAR; INTRAVENOUS at 11:05

## 2024-05-05 RX ADMIN — KETOROLAC TROMETHAMINE 10 MG: 30 INJECTION, SOLUTION INTRAMUSCULAR at 11:05

## 2024-05-05 RX ADMIN — ONDANSETRON 4 MG: 2 INJECTION INTRAMUSCULAR; INTRAVENOUS at 10:05

## 2024-05-06 VITALS
HEART RATE: 90 BPM | DIASTOLIC BLOOD PRESSURE: 84 MMHG | TEMPERATURE: 99 F | WEIGHT: 180 LBS | SYSTOLIC BLOOD PRESSURE: 117 MMHG | HEIGHT: 61 IN | BODY MASS INDEX: 33.99 KG/M2 | OXYGEN SATURATION: 99 % | RESPIRATION RATE: 20 BRPM

## 2024-05-06 NOTE — ED PROVIDER NOTES
ED Provider Note - 5/5/2024    History     Chief Complaint   Patient presents with    Abdominal Pain     Pt arrives to the ED c/o epigastric pain x1 week and took the magnesium citrate 3 days because she thought it was d/t constipation. Pt is still having abdominal pain with nausea & vomiting and loose watery stools. Pt started with SOB yesterday, reports hx of CHF. Pt is c/o headache that started today - has not tried anything OTC for headache.      Patient currently presents with complaint of abdominal pain.  Onset of this event was first noted 6-7 days ago.  This discomfort is localized to the epigastrium.  This discomfort is described as a aching sensation.  Patient notes vomiting associated with this process.  Patient notably denies dysuria, fever, urinary frequency, vaginal bleeding, and vaginal discharge.  Patient additionally describes intermittent shortness breath with exertion and general weakness.  Patient has a past medical history notable for heart failure and asthma as well as hypertension.  Patient finally describes concern regarding frontal headache.  No medication taken prior to arrival          Review of patient's allergies indicates:  No Known Allergies  Past Medical History:   Diagnosis Date    Asthma     CHF (congestive heart failure)     Hypertension     Stroke      Past Surgical History:   Procedure Laterality Date    breast augmention      MYOMECTOMY      TUBAL LIGATION      tummy tu  2021    lipo 360, BBL     Family History   Problem Relation Name Age of Onset    Ovarian cancer Maternal Grandmother      Breast cancer Neg Hx      Colon cancer Neg Hx       Social History     Tobacco Use    Smoking status: Never    Smokeless tobacco: Never   Substance Use Topics    Alcohol use: Yes     Comment: occasionally    Drug use: No     Review of Systems   Constitutional:  Negative for chills and fever.   HENT:  Negative for congestion and rhinorrhea.    Respiratory:  Positive for shortness of breath.  "Negative for cough.    Cardiovascular:  Positive for chest pain. Negative for palpitations.   Gastrointestinal:  Positive for abdominal pain. Negative for vomiting.   Genitourinary:  Negative for difficulty urinating and dysuria.   Skin:  Negative for color change and rash.   Neurological:  Positive for headaches. Negative for dizziness and light-headedness.   Hematological:  Negative for adenopathy. Does not bruise/bleed easily.     Physical Exam     Initial Vitals [05/05/24 2104]   BP Pulse Resp Temp SpO2   116/78 104 20 99.4 °F (37.4 °C) 98 %      MAP       --         Vitals:    05/05/24 2104 05/05/24 2132 05/05/24 2201 05/05/24 2232   BP: 116/78 111/81 109/69 116/73   Pulse: 104 91 88 84   Resp: 20 17 20 20   Temp: 99.4 °F (37.4 °C)      TempSrc: Oral      SpO2: 98% 99% 98% 98%   Weight: 81.6 kg (180 lb)      Height: 5' 1" (1.549 m)       05/06/24 0001   BP: 117/84   Pulse: 90   Resp:    Temp:    TempSrc:    SpO2: 99%   Weight:    Height:      Physical Exam    Nursing note and vitals reviewed.  Constitutional: She appears well-developed and well-nourished. She is not diaphoretic. No distress.   HENT:   Head: Normocephalic and atraumatic.   Nose: Nose normal.   Mouth/Throat: Oropharynx is clear and moist.   Eyes: Conjunctivae are normal. No scleral icterus.   Cardiovascular:  Normal rate, regular rhythm, normal heart sounds and intact distal pulses.           Pulmonary/Chest: Breath sounds normal. No respiratory distress.   Abdominal: Abdomen is soft. She exhibits no distension. There is no abdominal tenderness.   Musculoskeletal:         General: No edema. Normal range of motion.     Neurological: She is alert and oriented to person, place, and time. She has normal strength.   Skin: Skin is warm and dry.         ED Course   Procedures                   MDM  Differential Diagnoses   Based on available history, the working differential diagnoses considered during this evaluation include but are not limited to " acute coronary syndrome, pulmonary embolus, esophageal perforation, aortic dissection, pneumonia, and pneumothorax as well as musculoskeletal sources including chest wall strain and costochondritis and GI sources such as esophageal spasm and GERD..  Differential diagnosis with respect to her nausea and vomiting includes viral gastroenteritis, cholecystitis, food poisoning, intra-abdominal infection, enteritis/colitis        LABS     Labs Reviewed   COMPREHENSIVE METABOLIC PANEL - Abnormal; Notable for the following components:       Result Value    Potassium 3.3 (*)     Total Bilirubin 1.3 (*)     AST 75 (*)      (*)     All other components within normal limits   URINALYSIS, REFLEX TO URINE CULTURE - Abnormal; Notable for the following components:    Ketones, UA Trace (*)     Bilirubin (UA) 1+ (*)     Occult Blood UA Trace (*)     Urobilinogen, UA 2.0-3.0 (*)     All other components within normal limits    Narrative:     Preferred Collection Type->Urine, Clean Catch  Specimen Source->Urine   CBC W/ AUTO DIFFERENTIAL   TROPONIN I   NT-PRO NATRIURETIC PEPTIDE   POCT URINE PREGNANCY           All available results from the labs ordered were independently reviewed. with findings as follows:  CMP notable for mild hypokalemia.  Bilirubin mildly elevated at 1.3.  Mild transaminitis persistent and relatively similar to prior values on record.  Urinalysis shows only trace ketones and trace occult blood.  CBC, troponin, natriuretic peptide, and pregnancy test unremarkable     Imaging     Imaging Results              US Abdomen Limited (Final result)  Result time 05/06/24 02:25:37      Final result by Pernell Chase MD (05/06/24 02:25:37)                   Impression:      No acute findings.      Electronically signed by: Pernell Chase  Date:    05/06/2024  Time:    02:25               Narrative:    EXAMINATION:  US ABDOMEN LIMITED    CLINICAL HISTORY:  Epigastric Pain; Vomiting, Elevated  LFTs;    TECHNIQUE:  Limited ultrasound of the right upper quadrant of the abdomen .    COMPARISON:  None.    FINDINGS:  Unremarkable pancreas.    Unremarkable IVC.    Gallbladder is contracted.  No detectable stones.  Negative sonographic Solomon sign.  Normal common bile duct.  No intrahepatic biliary ductal dilatation.    Unremarkable liver measuring 16.5 cm.  No mass.    Small right renal cyst.                                       X-Ray Chest AP Portable (Final result)  Result time 05/05/24 22:15:59      Final result by Sindhu Elena MD (05/05/24 22:15:59)                   Impression:      Under inflation      Electronically signed by: Sindhu Elena  Date:    05/05/2024  Time:    22:15               Narrative:    EXAMINATION:  XR CHEST AP PORTABLE    CLINICAL HISTORY:  Chest Pain;    TECHNIQUE:  Single frontal portable view of the chest was performed.    COMPARISON:  November 2023    FINDINGS:  Lungs underinflated without consolidation sizable pleural effusion or convincing pneumothorax                                       X-Rays:   Independently Interpreted Readings:   Abdomen:   Flat and Erect of Abdomen - No air fluid levels or signs of obstruction.  No free air under diaphragm.  Nonspecific bowel gas.        EKG   EKG Readings: (Independently Interpreted)   Initial Reading: No STEMI. Rhythm: Normal Sinus Rhythm. Heart Rate: 95. Ectopy: No Ectopy. Conduction: Normal. Axis: Normal.     ED Management/Discussion     Medications   ondansetron injection 4 mg (4 mg Intravenous Given 5/5/24 2206)   metoclopramide injection 10 mg (10 mg Intravenous Given 5/5/24 2355)   ketorolac injection 9.999 mg (9.999 mg Intravenous Given 5/5/24 2355)                 The patient's list of active medical problems, social history, medications, and allergies as documented per RN staff has been reviewed.               On final assessment, the patient appears suitable for discharge.  Symptoms appear to be largely improved.   We have discussed all available findings and the recommendations for follow-up.      Given the time frame involved, I feel a single negative troponin level is adequate to rule out ACS in this setting.  Patient is additionally noted to be PERC negative.  I see no indication of an emergent process beyond that addressed during our encounter but have duly counseled the patient/family regarding the need for prompt follow-up as well as the indications that should prompt immediate return to the emergency room.  The patient/family has been provided with language -specific verbal and printed direction regarding our final diagnosis(es) as well as instructions regarding use of OTC and/or Rx medications intended to manage the patient's aforementioned conditions including:  ED Prescriptions    None           Patient has been advised of the following recommended follow-up instructions:  Follow-up Information       Follow up With Specialties Details Why Contact [facility] Emergency Dept Emergency Medicine Go to  As needed, If symptoms worsen [address] [facility] [address] [phone]    PCP  Schedule an appointment as soon as possible for a visit  reassessment/consideration for specialty referral           The patient/family communicates understanding of all this information and all remaining questions and concerns were addressed at this time.      Referrals:  No orders of the defined types were placed in this encounter.      CLINICAL IMPRESSION    ICD-10-CM ICD-9-CM   1. Nausea and vomiting, unspecified vomiting type  R11.2 787.01   2. SOB (shortness of breath)  R06.02 786.05   3. Transaminitis  R74.01 790.4   4. Chest pain, unspecified type  R07.9 786.50          ED Disposition Condition    Discharge Stable                 Niko Reddy MD  05/06/24 6359

## 2024-05-06 NOTE — ED NOTES
Pt attempted to obtain a urine specimen but forgot to collect it when using the restroom.  Dr. Reddy notified.

## 2024-05-07 LAB
OHS QRS DURATION: 82 MS
OHS QRS DURATION: 82 MS
OHS QTC CALCULATION: 465 MS
OHS QTC CALCULATION: 477 MS

## 2024-06-10 ENCOUNTER — PATIENT MESSAGE (OUTPATIENT)
Dept: ADMINISTRATIVE | Facility: HOSPITAL | Age: 39
End: 2024-06-10
Payer: MEDICAID

## 2024-07-11 ENCOUNTER — HOSPITAL ENCOUNTER (EMERGENCY)
Facility: HOSPITAL | Age: 39
Discharge: HOME | End: 2024-07-11
Attending: STUDENT IN AN ORGANIZED HEALTH CARE EDUCATION/TRAINING PROGRAM
Payer: MEDICAID

## 2024-07-11 VITALS
WEIGHT: 180 LBS | OXYGEN SATURATION: 98 % | HEIGHT: 61 IN | DIASTOLIC BLOOD PRESSURE: 88 MMHG | HEART RATE: 97 BPM | SYSTOLIC BLOOD PRESSURE: 141 MMHG | RESPIRATION RATE: 16 BRPM | TEMPERATURE: 98 F | BODY MASS INDEX: 33.99 KG/M2

## 2024-07-11 DIAGNOSIS — T54.91XA: ICD-10-CM

## 2024-07-11 LAB
INFLUENZA A, MOLECULAR: NEGATIVE
INFLUENZA B, MOLECULAR: NEGATIVE
SARS-COV-2 RDRP RESP QL NAA+PROBE: NEGATIVE
SPECIMEN SOURCE: NORMAL

## 2024-07-11 PROCEDURE — 99283 EMERGENCY DEPT VISIT LOW MDM: CPT | Mod: 25,ER

## 2024-07-11 PROCEDURE — U0002 COVID-19 LAB TEST NON-CDC: HCPCS | Mod: ER | Performed by: STUDENT IN AN ORGANIZED HEALTH CARE EDUCATION/TRAINING PROGRAM

## 2024-07-11 PROCEDURE — 87502 INFLUENZA DNA AMP PROBE: CPT | Mod: ER | Performed by: STUDENT IN AN ORGANIZED HEALTH CARE EDUCATION/TRAINING PROGRAM

## 2024-07-11 NOTE — Clinical Note
"Anne"Skylar Rangel was seen and treated in our emergency department on 7/11/2024.  She may return to work on 07/13/2024.       If you have any questions or concerns, please don't hesitate to call.      Orestes Hull MD"

## 2024-07-12 NOTE — ED NOTES
Poison control notified  Spoke with Gonzalo Buitrago beyond window of concern for ingestion. Recommendations were to work up for other concerns.

## 2024-07-12 NOTE — ED PROVIDER NOTES
"Encounter Date: 7/11/2024       History     Chief Complaint   Patient presents with    Poisoning     Pt reports drank half a cup of clorox and fabuloso yesterday " more than 24 hours ago" . Reports symptoms of "body aches, rib cage and under breast area" pt reports dry productive cough and throat burning when drinking certain foods or drinking certain liquids. Reports drank milk right after. Reports has had both solids and liquids.      39-year-old female presents with generalized malaise, body aches and coughing after inadvertently ingesting half a cup of fabulous so detergent and Clorox last evening.  She says she drank it thinking it was limited while taking her nightly medication.  She has been able to eat and drink.  She says that she drank milk right after the ingestion.  She has had no difficulty with talking although she says it is painful.  No spitting up.  She says that after the ingestion she did vomit, no hematemesis.  No vomiting today.  She says that she was looking up on [employer] her symptoms and was concern for pneumonia.      Review of patient's allergies indicates:  No Known Allergies  Past Medical History:   Diagnosis Date    Asthma     CHF (congestive heart failure)     Hypertension     Stroke      Past Surgical History:   Procedure Laterality Date    breast augmention      MYOMECTOMY      TUBAL LIGATION      tummy tuck  2021    lipo 360, BBL     Family History   Problem Relation Name Age of Onset    Ovarian cancer Maternal Grandmother      Breast cancer Neg Hx      Colon cancer Neg Hx       Social History     Tobacco Use    Smoking status: Never    Smokeless tobacco: Never   Substance Use Topics    Alcohol use: Yes     Comment: occasionally    Drug use: No     Review of Systems    Physical Exam     Initial Vitals [07/11/24 2018]   BP Pulse Resp Temp SpO2   (!) 141/88 97 16 98.2 °F (36.8 °C) 98 %      MAP       --         Physical Exam    Nursing note and vitals reviewed.  Constitutional: She " appears well-developed and well-nourished. She is not diaphoretic. No distress.   Conversational, tolerating secretions, not drooling   HENT:   Head: Normocephalic and atraumatic.   Eyes: EOM are normal. Pupils are equal, round, and reactive to light.   Neck: No tracheal deviation present.   Normal range of motion.  Cardiovascular:  Normal rate, regular rhythm, normal heart sounds and intact distal pulses.     Exam reveals no gallop and no friction rub.       No murmur heard.  Pulmonary/Chest: Breath sounds normal. No stridor. No respiratory distress. She has no wheezes. She has no rhonchi. She has no rales.   Abdominal: Abdomen is soft. Bowel sounds are normal. She exhibits no distension and no mass. There is no abdominal tenderness. There is no rebound and no guarding.   Musculoskeletal:         General: No edema. Normal range of motion.      Cervical back: Normal range of motion.     Neurological: She is alert and oriented to person, place, and time. GCS score is 15. GCS eye subscore is 4. GCS verbal subscore is 5. GCS motor subscore is 6.   Skin: Skin is warm and dry. Capillary refill takes less than 2 seconds.   Psychiatric: She has a normal mood and affect. Thought content normal.         ED Course   Procedures  Labs Reviewed   INFLUENZA A & B BY MOLECULAR   SARS-COV-2 RNA AMPLIFICATION, QUAL          Imaging Results              X-Ray Chest AP Portable (Final result)  Result time 07/11/24 21:30:09      Final result by Sindhu Elena MD (07/11/24 21:30:09)                   Impression:      Stable chest exam under inflation      Electronically signed by: Sindhu Elena  Date:    07/11/2024  Time:    21:30               Narrative:    EXAMINATION:  XR CHEST AP PORTABLE    CLINICAL HISTORY:  Toxic effect of unspecified corrosive substance, accidental (unintentional), initial encounter    TECHNIQUE:  Single frontal portable view of the chest was performed.    COMPARISON:  05/05/2024    FINDINGS:  Lungs  underinflated unchanged without consolidation no sizable pleural effusion                                       Medications - No data to display  Medical Decision Making  Hemodynamically stable. Afebrile. Phonating and protecting the airway spontaneously. No clinical evidence for cardiovascular instability or impending airway compromise. Examination as above.  Prior medical records reviewed.  Primary care note reviewed, history of heart failure. Current co-morbidities considered that will impact clinical decision making include as above.    Plan:  Patient with caustic ingestion greater than 24 hours ago, tolerating secretions, not drooling, protecting airway spontaneously.  She is very well-appearing.  There may be degree of aspiration pneumonitis albeit mild.  Not tachycardic, not hypoxic afebrile and normotensive.  Patient very worried that she has a pneumonia or is having another illness.  Will obtain x-ray, swabs in an abundance of caution.      Amount and/or Complexity of Data Reviewed  Radiology: ordered.               ED Course as of 07/11/24 2140   Thu Jul 11, 2024 2139 Swabs negative. CXR negative. The patient was reassessed and on subsequent re-evaluation, they were subjectively feeling better. They were resting comfortably and in no acute distress. I discussed the laboratory and diagnostic findings with the patient. Education was provided and all questions were answered. As discussed, they were recommended to follow up with their primary care physician within the next few days and to return to the emergency department sooner for any new or worsening. They acknowledged and verbalized agreement to the treatment plan. The patient was discharged home in stable condition.     DISCLAIMER: This note was prepared with [employer] [email or web address]) voice recognition transcription software. Garbled syntax, mangled pronouns, and other bizarre constructions may be attributed to that software system.    [BG]      ED Course User  Index  [BG] Orestes Hull MD                           Clinical Impression:  Final diagnoses:  [T54.91XA] Ingestion of bleach          ED Disposition Condition    Discharge Stable          ED Prescriptions    None       Follow-up Information       Follow up With Specialties Details Why Contact Info    Leana Feliz, DO Urgent Care Go to  As needed [address] [address] [phone]               Orestes Hull MD  07/11/24 1811

## 2024-07-12 NOTE — DISCHARGE INSTRUCTIONS

## 2024-08-09 ENCOUNTER — PATIENT MESSAGE (OUTPATIENT)
Dept: ADMINISTRATIVE | Facility: HOSPITAL | Age: 39
End: 2024-08-09
Payer: MEDICAID

## 2024-10-03 ENCOUNTER — PATIENT MESSAGE (OUTPATIENT)
Dept: ADMINISTRATIVE | Facility: HOSPITAL | Age: 39
End: 2024-10-03
Payer: MEDICAID

## 2025-05-08 ENCOUNTER — OFFICE VISIT (OUTPATIENT)
Dept: OBSTETRICS AND GYNECOLOGY | Facility: CLINIC | Age: 40
End: 2025-05-08
Payer: MEDICAID

## 2025-05-08 VITALS
DIASTOLIC BLOOD PRESSURE: 80 MMHG | BODY MASS INDEX: 36.55 KG/M2 | HEIGHT: 61 IN | SYSTOLIC BLOOD PRESSURE: 122 MMHG | WEIGHT: 193.56 LBS

## 2025-05-08 DIAGNOSIS — R82.90 ABNORMAL URINE ODOR: ICD-10-CM

## 2025-05-08 DIAGNOSIS — M54.50 ACUTE LEFT-SIDED LOW BACK PAIN WITHOUT SCIATICA: Primary | ICD-10-CM

## 2025-05-08 DIAGNOSIS — Z86.018 HISTORY OF UTERINE FIBROID: ICD-10-CM

## 2025-05-08 LAB
BILIRUB UR QL STRIP.AUTO: NEGATIVE
CLARITY UR: ABNORMAL
COLOR UR AUTO: YELLOW
GLUCOSE UR QL STRIP: NEGATIVE
HGB UR QL STRIP: ABNORMAL
HOLD SPECIMEN: NORMAL
KETONES UR QL STRIP: NEGATIVE
LEUKOCYTE ESTERASE UR QL STRIP: NEGATIVE
NITRITE UR QL STRIP: NEGATIVE
PH UR STRIP: 7 [PH]
PROT UR QL STRIP: ABNORMAL
SP GR UR STRIP: 1.02
UROBILINOGEN UR STRIP-ACNC: ABNORMAL EU/DL

## 2025-05-08 PROCEDURE — 3008F BODY MASS INDEX DOCD: CPT | Mod: CPTII,,, | Performed by: STUDENT IN AN ORGANIZED HEALTH CARE EDUCATION/TRAINING PROGRAM

## 2025-05-08 PROCEDURE — 87591 N.GONORRHOEAE DNA AMP PROB: CPT | Performed by: STUDENT IN AN ORGANIZED HEALTH CARE EDUCATION/TRAINING PROGRAM

## 2025-05-08 PROCEDURE — 3079F DIAST BP 80-89 MM HG: CPT | Mod: CPTII,,, | Performed by: STUDENT IN AN ORGANIZED HEALTH CARE EDUCATION/TRAINING PROGRAM

## 2025-05-08 PROCEDURE — 99213 OFFICE O/P EST LOW 20 MIN: CPT | Mod: PBBFAC | Performed by: STUDENT IN AN ORGANIZED HEALTH CARE EDUCATION/TRAINING PROGRAM

## 2025-05-08 PROCEDURE — 99999 PR PBB SHADOW E&M-EST. PATIENT-LVL III: CPT | Mod: PBBFAC,,, | Performed by: STUDENT IN AN ORGANIZED HEALTH CARE EDUCATION/TRAINING PROGRAM

## 2025-05-08 PROCEDURE — 81003 URINALYSIS AUTO W/O SCOPE: CPT | Performed by: STUDENT IN AN ORGANIZED HEALTH CARE EDUCATION/TRAINING PROGRAM

## 2025-05-08 PROCEDURE — 3074F SYST BP LT 130 MM HG: CPT | Mod: CPTII,,, | Performed by: STUDENT IN AN ORGANIZED HEALTH CARE EDUCATION/TRAINING PROGRAM

## 2025-05-08 PROCEDURE — 1159F MED LIST DOCD IN RCRD: CPT | Mod: CPTII,,, | Performed by: STUDENT IN AN ORGANIZED HEALTH CARE EDUCATION/TRAINING PROGRAM

## 2025-05-08 PROCEDURE — 99214 OFFICE O/P EST MOD 30 MIN: CPT | Mod: S$PBB,,, | Performed by: STUDENT IN AN ORGANIZED HEALTH CARE EDUCATION/TRAINING PROGRAM

## 2025-05-08 RX ORDER — PANTOPRAZOLE SODIUM 40 MG/1
40 TABLET, DELAYED RELEASE ORAL EVERY MORNING
COMMUNITY
Start: 2025-04-30 | End: 2025-07-29

## 2025-05-08 RX ORDER — POLYETHYLENE GLYCOL 3350 17 G/17G
17 POWDER, FOR SOLUTION ORAL DAILY
Qty: 30 EACH | Refills: 1 | Status: SHIPPED | OUTPATIENT
Start: 2025-05-08

## 2025-05-08 RX ORDER — TOPIRAMATE 25 MG/1
25 TABLET ORAL
COMMUNITY
Start: 2025-04-30 | End: 2025-07-29

## 2025-05-08 RX ORDER — SEMAGLUTIDE 2.68 MG/ML
INJECTION, SOLUTION SUBCUTANEOUS
COMMUNITY
Start: 2024-12-11

## 2025-05-08 NOTE — PROGRESS NOTES
Gynecology    SUBJECTIVE:     Chief Complaint: Urinary Tract Infection       History of Present Illness:  Anne Rangel is a 41 yo  here today for evaluation of possible UTI. She reports that for the past two weeks she has been experiencing pain in her lower back on the left side that radiates to the front of her stomach. The pain is exacerbated by movements. Not relieved with ibuprofen. She also notes an abnormal odor to her urine for the past two weeks. These two symptoms made her concerned for UTI.  She is sexually active with one male partner. Uses BTL for contraception. Denies abnormal vaginal discharge or abnormal bleeding.  She has a history of uterine fibroids with myomectomy in the past. Last TVUS in  demonstrated 4.6 x 3.8 x 6 cm exophytic fibroid at the fundus.  She endorses constipation and states she has a bowel movement every few days.    Review of Systems:  Review of Systems   Constitutional:  Negative for chills and fever.   Respiratory:  Negative for shortness of breath.    Cardiovascular:  Negative for chest pain.   Gastrointestinal:  Positive for constipation. Negative for abdominal pain, nausea and vomiting.   Endocrine: Negative for hot flashes.   Genitourinary:  Negative for menstrual problem and vaginal discharge.   Musculoskeletal:  Positive for back pain.   Integumentary:  Negative for breast mass, nipple discharge and breast skin changes.   Neurological:  Negative for headaches.   Hematological:  Does not bruise/bleed easily.   Psychiatric/Behavioral:  Negative for depression.    Breast: Negative for mass, mastodynia, nipple discharge and skin changes       OBJECTIVE:     Physical Exam:  Physical Exam  Constitutional:       Appearance: Normal appearance.   HENT:      Head: Normocephalic and atraumatic.   Eyes:      Conjunctiva/sclera: Conjunctivae normal.      Pupils: Pupils are equal, round, and reactive to light.   Cardiovascular:      Rate and Rhythm: Normal rate and  regular rhythm.   Pulmonary:      Effort: Pulmonary effort is normal. No respiratory distress.   Abdominal:      General: Abdomen is flat. There is no distension.      Palpations: Abdomen is soft.      Tenderness: There is abdominal tenderness in the left upper quadrant.      Comments: Mild TTP with deep palpation of LUQ   Musculoskeletal:         General: Normal range of motion.      Cervical back: Normal range of motion.      Lumbar back: No tenderness.      Comments: No CVA tenderness. Pain not elicited with palpation along L lumbar area   Neurological:      Mental Status: She is alert.   Psychiatric:         Mood and Affect: Mood normal.         Thought Content: Thought content normal.           ASSESSMENT:       ICD-10-CM ICD-9-CM    1. Acute left-sided low back pain without sciatica  M54.50 724.2 polyethylene glycol (GLYCOLAX) 17 gram PwPk      2. Abnormal urine odor  R82.90 791.9 Urinalysis, Reflex to Urine Culture Urine, Clean Catch      C. trachomatis/N. gonorrhoeae by AMP DNA      3. History of uterine fibroid  Z86.018 V13.29 US Pelvis Comp with Transvag NON-OB (xpd             Plan:      Anne was seen today for urinary tract infection.    Diagnoses and all orders for this visit:    Acute left-sided low back pain without sciatica  -     polyethylene glycol (GLYCOLAX) 17 gram PwPk; Take 17 g by mouth once daily.    Abnormal urine odor  -     Urinalysis, Reflex to Urine Culture Urine, Clean Catch  -     C. trachomatis/N. gonorrhoeae by AMP DNA    History of uterine fibroid  -     US Pelvis Comp with Transvag NON-OB (xpd; Future    Will follow up results with patient to determine next steps of care    Orders Placed This Encounter   Procedures    US Pelvis Comp with Transvag NON-OB (xpd    Urinalysis, Reflex to Urine Culture Urine, Clean Catch    C. trachomatis/N. gonorrhoeae by AMP DNA       Follow up if symptoms worsen or fail to improve.    Favian Chua

## 2025-05-10 LAB
C TRACH DNA SPEC QL NAA+PROBE: NOT DETECTED
CTGC SOURCE (OHS) ORD-325: NORMAL
N GONORRHOEA DNA UR QL NAA+PROBE: NOT DETECTED

## 2025-05-12 ENCOUNTER — RESULTS FOLLOW-UP (OUTPATIENT)
Dept: OBSTETRICS AND GYNECOLOGY | Facility: CLINIC | Age: 40
End: 2025-05-12

## 2025-05-29 ENCOUNTER — HOSPITAL ENCOUNTER (EMERGENCY)
Facility: HOSPITAL | Age: 40
Discharge: HOME | End: 2025-05-29
Attending: STUDENT IN AN ORGANIZED HEALTH CARE EDUCATION/TRAINING PROGRAM
Payer: MEDICAID

## 2025-05-29 VITALS
WEIGHT: 180 LBS | OXYGEN SATURATION: 99 % | RESPIRATION RATE: 16 BRPM | HEIGHT: 61 IN | SYSTOLIC BLOOD PRESSURE: 128 MMHG | TEMPERATURE: 98 F | BODY MASS INDEX: 33.99 KG/M2 | DIASTOLIC BLOOD PRESSURE: 64 MMHG | HEART RATE: 72 BPM

## 2025-05-29 DIAGNOSIS — R05.9 COUGH: ICD-10-CM

## 2025-05-29 DIAGNOSIS — S06.0X1A CONCUSSION WITH LOSS OF CONSCIOUSNESS OF 30 MINUTES OR LESS, INITIAL ENCOUNTER: ICD-10-CM

## 2025-05-29 DIAGNOSIS — M54.2 NECK PAIN: ICD-10-CM

## 2025-05-29 DIAGNOSIS — H65.93 OME (OTITIS MEDIA WITH EFFUSION), BILATERAL: Primary | ICD-10-CM

## 2025-05-29 LAB
B-HCG UR QL: NEGATIVE
CTP QC/QA: YES

## 2025-05-29 PROCEDURE — 25000003 PHARM REV CODE 250: Mod: ER

## 2025-05-29 PROCEDURE — 96372 THER/PROPH/DIAG INJ SC/IM: CPT

## 2025-05-29 PROCEDURE — 81025 URINE PREGNANCY TEST: CPT | Mod: ER

## 2025-05-29 PROCEDURE — 63600175 PHARM REV CODE 636 W HCPCS: Mod: ER

## 2025-05-29 PROCEDURE — 99285 EMERGENCY DEPT VISIT HI MDM: CPT | Mod: 25,ER

## 2025-05-29 RX ORDER — METHOCARBAMOL 750 MG/1
750 TABLET, FILM COATED ORAL 4 TIMES DAILY
Qty: 40 TABLET | Refills: 0 | Status: SHIPPED | OUTPATIENT
Start: 2025-05-29 | End: 2025-06-08

## 2025-05-29 RX ORDER — ACETAMINOPHEN 500 MG
1000 TABLET ORAL
Status: COMPLETED | OUTPATIENT
Start: 2025-05-29 | End: 2025-05-29

## 2025-05-29 RX ORDER — CETIRIZINE HYDROCHLORIDE 10 MG/1
10 TABLET ORAL DAILY
Qty: 30 TABLET | Refills: 0 | Status: SHIPPED | OUTPATIENT
Start: 2025-05-29 | End: 2025-06-28

## 2025-05-29 RX ORDER — ACETAMINOPHEN 500 MG
1000 TABLET ORAL 4 TIMES DAILY
Qty: 112 TABLET | Refills: 0 | Status: SHIPPED | OUTPATIENT
Start: 2025-05-29 | End: 2025-06-12

## 2025-05-29 RX ORDER — METHOCARBAMOL 500 MG/1
500 TABLET, FILM COATED ORAL
Status: COMPLETED | OUTPATIENT
Start: 2025-05-29 | End: 2025-05-29

## 2025-05-29 RX ORDER — PROCHLORPERAZINE EDISYLATE 5 MG/ML
10 INJECTION INTRAMUSCULAR; INTRAVENOUS
Status: COMPLETED | OUTPATIENT
Start: 2025-05-29 | End: 2025-05-29

## 2025-05-29 RX ORDER — FLUTICASONE PROPIONATE 50 MCG
2 SPRAY, SUSPENSION (ML) NASAL 2 TIMES DAILY PRN
Qty: 15 G | Refills: 0 | Status: SHIPPED | OUTPATIENT
Start: 2025-05-29

## 2025-05-29 RX ORDER — DIPHENHYDRAMINE HCL 25 MG
25 CAPSULE ORAL
Status: COMPLETED | OUTPATIENT
Start: 2025-05-29 | End: 2025-05-29

## 2025-05-29 RX ADMIN — DIPHENHYDRAMINE HYDROCHLORIDE 25 MG: 25 CAPSULE ORAL at 12:05

## 2025-05-29 RX ADMIN — ACETAMINOPHEN 1000 MG: 500 TABLET ORAL at 12:05

## 2025-05-29 RX ADMIN — METHOCARBAMOL 500 MG: 500 TABLET ORAL at 12:05

## 2025-05-29 RX ADMIN — PROCHLORPERAZINE EDISYLATE 10 MG: 5 INJECTION INTRAMUSCULAR; INTRAVENOUS at 12:05
